# Patient Record
Sex: MALE | Race: WHITE | NOT HISPANIC OR LATINO | Employment: FULL TIME | URBAN - METROPOLITAN AREA
[De-identification: names, ages, dates, MRNs, and addresses within clinical notes are randomized per-mention and may not be internally consistent; named-entity substitution may affect disease eponyms.]

---

## 2017-04-05 ENCOUNTER — OFFICE VISIT (OUTPATIENT)
Dept: PODIATRY | Facility: CLINIC | Age: 52
End: 2017-04-05
Payer: COMMERCIAL

## 2017-04-05 VITALS
WEIGHT: 197 LBS | HEIGHT: 72 IN | BODY MASS INDEX: 26.68 KG/M2 | DIASTOLIC BLOOD PRESSURE: 64 MMHG | SYSTOLIC BLOOD PRESSURE: 100 MMHG | HEART RATE: 56 BPM | TEMPERATURE: 98.2 F

## 2017-04-05 DIAGNOSIS — M72.2 PLANTAR FASCIITIS: Primary | ICD-10-CM

## 2017-04-05 PROCEDURE — 99203 OFFICE O/P NEW LOW 30 MIN: CPT | Performed by: PODIATRIST

## 2017-04-05 RX ORDER — ATORVASTATIN CALCIUM 40 MG/1
40 TABLET, FILM COATED ORAL DAILY
COMMUNITY

## 2017-04-05 RX ORDER — FLECAINIDE ACETATE 100 MG/1
200 TABLET ORAL
Refills: 3 | COMMUNITY
Start: 2017-02-03 | End: 2024-09-24

## 2017-04-05 NOTE — PATIENT INSTRUCTIONS
PLANTAR FASCIITIS     What is plantar fasciitis?     Plantar fasciitis is often referred to as heel spurs or heel pain. Plantar fasciitis is a very common problem that affects people of all foot shapes, age, weight and activity level. Pain may be in the arch or on the weight-bearing surface of the heel. The pain may come on without injury or identifiable cause. Pain is generally present when first getting out of bed in the morning or up from a seated break.   What causes plantar fasciitis?     The plantar fascia is a dense fibrous band of tissue that stretches across the bottom surface of the foot. The fascia helps support the foot muscles and arch. Plantar fasciitis is thought to be caused by mechanical strain or overload. Frequent walking without shoes or wearing unsupportive shoes is thought to cause structural overload and ultimately inflammation of the plantar fascia. Some people have heel spurs that can be seen on x-ray. The heel spur is actually evidence of plantar fascitis and is not the cause.   How long will this last?     Plantar fasciitis can last from one day to a lifetime. Some people get intermittent fasciitis that is very short-lived. Others suffer daily for years. Excessive body weight, frequent bare foot walking, long hours on the feet, inadequate shoes, predisposing foot structures and excessive activity such as running are all potential issues that lead to chronic and/or recurring plantar fasciitis. Having plantar fasciitis means that you are forever prone to this problem and will require modification of some of the above factors. Most people seek treatment within one to four months. Healing usually requires a similar one to four month time frame. Healing time is relative to the amount of effort spent treating the problem.   What can I do?     The easiest solution is to stop walking around your home without shoes. Plantar fasciitis is largely a shoe problem. Shoes are either not being worn often  enough or your current shoes are inadequate for your weight, foot structure or activity level. The majority of shoes on the market today are not sufficient to resist development of plantar fasciitis or to promote healing. Assume that your current shoes are inadequate and will need to be replaced. Even high quality shoes wear out with 6 months to one year of frequent use. Weight loss is another option. Losing ten pounds in the next two months may be enough to resolve the problem. Ice applied to the area of pain two to three times per day for ten minutes each session can be very helpful. This should continue until the problem resolves. Achilles tendon stretching is essential. Stretch multiple times daily to promote healing and to prevent recurrence in the future.     What if this does not help?     Medical treatments often include custom arch supports, cortisone injections, physical therapy, splints to be worn in bed, prescription medications and surgery. The home treatments listed above will be necessary regardless of these advanced medical treatments. Surgery is rarely needed but is very helpful in selected cases.     Heel pain in my future?   Plantar fasciitis is highly recurrent. Risk factors often continue, including return to barefoot walking, inadequate shoes, excessive body weight, excessive activities, etc. Your lifestyle and foot structure may predispose you to recurrent plantar fasciitis. A daily prevention regimen can be very helpful. Ongoing use of shoe inserts, careful attention to appropriate shoes, daily Achilles stretching, etc. may prevent recurrence. Prompt attention at the earliest warning signs of heel pain can resolve the problem in as short as a few days.   Below are some exercises for Plantar fasciitis:  Stair exercise  Step on a stair with the ball of your foot and hold your position for at least 15 seconds, then slowly step down with the heels of your foot. You can do this daily and as often  as you want.   Picking the towel  Sit comfortably and then pick at a towel with your toes. Do this at least 10 to 20 times regularly. You can use any object other than a towel as long as the material is soft.  Rolling the bottle or ball  You can get a small ball or bottle and then roll it with your foot. Do this daily for at least 15 to 20 times.   Stretching the calf  Lie on your back, raise one foot, and then point your toes towards the floor. Do this daily for at least 15 to 20 times.   Flex the toes  Sit comfortably and then flex your toes by pointing it towards the floor or towards your body. This will relax and flex your foot and exercise your plantar fascia, the calf, and the Achilles tendon. The inability of the foot to stretch often causes the bunching up of the plantar fascia area, leading to the pain.  Towel stretch  Sling a towel around the ball of the foot and stretch the foot back.  Hold for 15-20 seconds.  Do this 4-5 times/day.    Massaging the calf and the plantar fascia also helps a lot in alleviating the pain and preventing its recurrence.            Over the Counter Inserts    Super Feet are the most common and easiest to find.    Locations include any [employer], [employer] in [place] on [address] [address] and in [place] on [address], [employer] in [place] on [address], [facility] Running Room in [place] on [facility], [facility] Running Room in [place] on [address], [employer] in [place] on [address] [address] and [employer] Sport Shop in [place] on [address] and in [place] [place] on [facility].    Spenco can be found online and at [employer]oe [employer] in [place] on [address], [employer] in [facility], [employer] Running Store in [place] on [address] [facility], [employer] in [place] on [address] and [employer] in [place] on [address].    Power Step can be a little harder to find.  Locations include [employer] in [place] on [address],  [employer] in [facility], [employer] Store in [place] on [address] and online    Walk-Fit - [employer]     [facility]    **  A good high quality over the counter insert can cost around $40-$50.        Body Mass Index (BMI)  Many things can cause foot and ankle problems. Foot structure, activity level, foot mechanics and injuries are common causes of pain.  One very important issue that often goes unmentioned is body weight. Extra weight can cause increased stress on muscles, ligaments, bones and tendons. Sometimes just a few extra pounds is all it takes to put one over her/his threshold. Without reducing that stress, it can be difficult to alleviate pain.   Some people are uncomfortable addressing this issue, but we feel it is important for you to think about it. As Foot & Ankle specialists, our job is addressing the lower extremity problem and possible causes.   Regarding extra body weight, we encourage patients to discuss diet and weight management plans with their primary care doctors. It is this team approach that gives you the best opportunity for pain relief and getting you back on your feet.

## 2017-04-05 NOTE — LETTER
4/5/2017       RE: Lizandro Collado  [address]    [place] [address]           Dear Colleague,    Thank you for referring your patient, Lizandro Collado, to the [facility]. Please see a copy of my visit note below.    PATIENT HISTORY:  Lizandro Collado is a 52 year old male who presents to clinic for left plantar heel and arch pain.  Present for a month.  No injury.  Worse with activity and after rest.  5-8/10 pain.  He has tried ice, Dr. Jimenez's inserts.  No improvement.      Review of Systems:  Patient denies fever, chills, rash, wound, stiffness, limping, numbness, weakness, heart burn, blood in stool, chest pain with activity, calf pain when walking, shortness of breath with activity, chronic cough, easy bleeding/bruising, swelling of ankles, excessive thirst, fatigue, depression, anxiety.       PAST MEDICAL HISTORY: History reviewed. No pertinent past medical history.     PAST SURGICAL HISTORY: History reviewed. No pertinent surgical history.     MEDICATIONS:   Current Outpatient Prescriptions:      atorvastatin (LIPITOR) 40 MG tablet, Take 40 mg by mouth daily, Disp: , Rfl:      METOPROLOL SUCCINATE ER PO, Take by mouth daily, Disp: , Rfl:      flecainide (TAMBOCOR) 100 MG tablet, TK ONE-HALF T PO BID, Disp: , Rfl: 3     ALLERGIES:  No Known Allergies     SOCIAL HISTORY:   Social History     Social History     Marital status:      Spouse name: N/A     Number of children: N/A     Years of education: N/A     Occupational History     Not on file.     Social History Main Topics     Smoking status: Never Smoker     Smokeless tobacco: Not on file     Alcohol use Not on file     Drug use: Not on file     Sexual activity: Not on file     Other Topics Concern     Not on file     Social History Narrative     No narrative on file        FAMILY HISTORY: History reviewed. No pertinent family history.     EXAM:Vitals: /64 (Cuff Size: Adult Large)  Pulse 56  Temp 98.2  F (36.8  " C) (Oral)  Ht 5' 11.75\" (1.822 m)  Wt 197 lb (89.4 kg)  BMI 26.9 kg/m2  BMI= Body mass index is 26.9 kg/(m^2).    General appearance: Patient is alert and fully cooperative with history & exam.  No sign of distress is noted during the visit.     Psychiatric: Affect is pleasant & appropriate.  Patient appears motivated to improve health.     Respiratory: Breathing is regular & unlabored while sitting.     HEENT: Hearing is intact to spoken word.  Speech is clear.  No gross evidence of visual impairment that would impact ambulation.     Dermatologic: Skin is intact to both lower extremities without significant lesions, rash or abrasion.  No paronychia or evidence of soft tissue infection is noted.     Vascular: DP & PT pulses are intact & regular bilaterally.  No significant edema or varicosities noted.  CFT and skin temperature are normal to both lower extremities.     Neurologic: Lower extremity sensation is intact to light touch.  No evidence of weakness or contracture in the lower extremities.  No evidence of neuropathy.     Musculoskeletal: Left plantar heel and arch pain along the plantar fascia with palpatoin.  no pain with heel squeeze.  Patient is ambulatory without assistive device or brace.  No gross ankle deformity noted.  No foot or ankle joint effusion is noted.     ASSESSMENT: Plantar fasciitis, left     PLAN:  Reviewed patient's chart in epic.  Discussed condition and treatment options including pros and cons.    The potential causes and nature of plantar fasciitis were discussed with the patient.  We reviewed the natural history/prognosis of the condition and risks if left untreated.       We discussed possible causes of the condition as it relates to the patients specific situation.      Conservative treatment options were reviewed:  appropriate shoes, avoidance of barefoot walking, inserts/orthoses, stretching, ice, massage, immobilization and NSAIDs.     We also reviewed the options of " injection therapy and surgery.  However, it was made clear that surgery is only considered when conservative therapy fails.       After thorough discussion and answering all questions, the patient elected to try icing, stretching, superfeet.  Night splint offered, but pt declined.  F/u 1 month prn.          Pineda Moon DPM, FACFAS        Weight management plan: Patient was referred to their PCP to discuss a diet and exercise plan.      Again, thank you for allowing me to participate in the care of your patient.        Sincerely,              Pineda Moon DPM

## 2017-04-05 NOTE — PROGRESS NOTES
"PATIENT HISTORY:  Lizandro Collado is a 52 year old male who presents to clinic for left plantar heel and arch pain.  Present for a month.  No injury.  Worse with activity and after rest.  5-8/10 pain.  He has tried ice, Dr. Jimenez's inserts.  No improvement.      Review of Systems:  Patient denies fever, chills, rash, wound, stiffness, limping, numbness, weakness, heart burn, blood in stool, chest pain with activity, calf pain when walking, shortness of breath with activity, chronic cough, easy bleeding/bruising, swelling of ankles, excessive thirst, fatigue, depression, anxiety.       PAST MEDICAL HISTORY: History reviewed. No pertinent past medical history.     PAST SURGICAL HISTORY: History reviewed. No pertinent surgical history.     MEDICATIONS:   Current Outpatient Prescriptions:      atorvastatin (LIPITOR) 40 MG tablet, Take 40 mg by mouth daily, Disp: , Rfl:      METOPROLOL SUCCINATE ER PO, Take by mouth daily, Disp: , Rfl:      flecainide (TAMBOCOR) 100 MG tablet, TK ONE-HALF T PO BID, Disp: , Rfl: 3     ALLERGIES:  No Known Allergies     SOCIAL HISTORY:   Social History     Social History     Marital status:      Spouse name: N/A     Number of children: N/A     Years of education: N/A     Occupational History     Not on file.     Social History Main Topics     Smoking status: Never Smoker     Smokeless tobacco: Not on file     Alcohol use Not on file     Drug use: Not on file     Sexual activity: Not on file     Other Topics Concern     Not on file     Social History Narrative     No narrative on file        FAMILY HISTORY: History reviewed. No pertinent family history.     EXAM:Vitals: /64 (Cuff Size: Adult Large)  Pulse 56  Temp 98.2  F (36.8  C) (Oral)  Ht 5' 11.75\" (1.822 m)  Wt 197 lb (89.4 kg)  BMI 26.9 kg/m2  BMI= Body mass index is 26.9 kg/(m^2).    General appearance: Patient is alert and fully cooperative with history & exam.  No sign of distress is noted during the visit.   "   Psychiatric: Affect is pleasant & appropriate.  Patient appears motivated to improve health.     Respiratory: Breathing is regular & unlabored while sitting.     HEENT: Hearing is intact to spoken word.  Speech is clear.  No gross evidence of visual impairment that would impact ambulation.     Dermatologic: Skin is intact to both lower extremities without significant lesions, rash or abrasion.  No paronychia or evidence of soft tissue infection is noted.     Vascular: DP & PT pulses are intact & regular bilaterally.  No significant edema or varicosities noted.  CFT and skin temperature are normal to both lower extremities.     Neurologic: Lower extremity sensation is intact to light touch.  No evidence of weakness or contracture in the lower extremities.  No evidence of neuropathy.     Musculoskeletal: Left plantar heel and arch pain along the plantar fascia with palpatoin.  no pain with heel squeeze.  Patient is ambulatory without assistive device or brace.  No gross ankle deformity noted.  No foot or ankle joint effusion is noted.     ASSESSMENT: Plantar fasciitis, left     PLAN:  Reviewed patient's chart in epic.  Discussed condition and treatment options including pros and cons.    The potential causes and nature of plantar fasciitis were discussed with the patient.  We reviewed the natural history/prognosis of the condition and risks if left untreated.       We discussed possible causes of the condition as it relates to the patients specific situation.      Conservative treatment options were reviewed:  appropriate shoes, avoidance of barefoot walking, inserts/orthoses, stretching, ice, massage, immobilization and NSAIDs.     We also reviewed the options of injection therapy and surgery.  However, it was made clear that surgery is only considered when conservative therapy fails.       After thorough discussion and answering all questions, the patient elected to try icing, stretching, superfeet.  Night splint  offered, but pt declined.  F/u 1 month prn.          Pineda Moon, LUIS ANGELM, FACFAS

## 2017-04-05 NOTE — MR AVS SNAPSHOT
After Visit Summary   4/5/2017    Lizandro Collado    MRN: [id number]           Patient Information     Date Of Birth          1965        Visit Information        Provider Department      4/5/2017 4:00 PM Pineda Moon DPM [facility]        Today's Diagnoses     Plantar fasciitis    -  1      Care Instructions    PLANTAR FASCIITIS     What is plantar fasciitis?     Plantar fasciitis is often referred to as heel spurs or heel pain. Plantar fasciitis is a very common problem that affects people of all foot shapes, age, weight and activity level. Pain may be in the arch or on the weight-bearing surface of the heel. The pain may come on without injury or identifiable cause. Pain is generally present when first getting out of bed in the morning or up from a seated break.   What causes plantar fasciitis?     The plantar fascia is a dense fibrous band of tissue that stretches across the bottom surface of the foot. The fascia helps support the foot muscles and arch. Plantar fasciitis is thought to be caused by mechanical strain or overload. Frequent walking without shoes or wearing unsupportive shoes is thought to cause structural overload and ultimately inflammation of the plantar fascia. Some people have heel spurs that can be seen on x-ray. The heel spur is actually evidence of plantar fascitis and is not the cause.   How long will this last?     Plantar fasciitis can last from one day to a lifetime. Some people get intermittent fasciitis that is very short-lived. Others suffer daily for years. Excessive body weight, frequent bare foot walking, long hours on the feet, inadequate shoes, predisposing foot structures and excessive activity such as running are all potential issues that lead to chronic and/or recurring plantar fasciitis. Having plantar fasciitis means that you are forever prone to this problem and will require modification of some of the above factors. Most people  seek treatment within one to four months. Healing usually requires a similar one to four month time frame. Healing time is relative to the amount of effort spent treating the problem.   What can I do?     The easiest solution is to stop walking around your home without shoes. Plantar fasciitis is largely a shoe problem. Shoes are either not being worn often enough or your current shoes are inadequate for your weight, foot structure or activity level. The majority of shoes on the market today are not sufficient to resist development of plantar fasciitis or to promote healing. Assume that your current shoes are inadequate and will need to be replaced. Even high quality shoes wear out with 6 months to one year of frequent use. Weight loss is another option. Losing ten pounds in the next two months may be enough to resolve the problem. Ice applied to the area of pain two to three times per day for ten minutes each session can be very helpful. This should continue until the problem resolves. Achilles tendon stretching is essential. Stretch multiple times daily to promote healing and to prevent recurrence in the future.     What if this does not help?     Medical treatments often include custom arch supports, cortisone injections, physical therapy, splints to be worn in bed, prescription medications and surgery. The home treatments listed above will be necessary regardless of these advanced medical treatments. Surgery is rarely needed but is very helpful in selected cases.     Heel pain in my future?   Plantar fasciitis is highly recurrent. Risk factors often continue, including return to barefoot walking, inadequate shoes, excessive body weight, excessive activities, etc. Your lifestyle and foot structure may predispose you to recurrent plantar fasciitis. A daily prevention regimen can be very helpful. Ongoing use of shoe inserts, careful attention to appropriate shoes, daily Achilles stretching, etc. may prevent  recurrence. Prompt attention at the earliest warning signs of heel pain can resolve the problem in as short as a few days.   Below are some exercises for Plantar fasciitis:  Stair exercise  Step on a stair with the ball of your foot and hold your position for at least 15 seconds, then slowly step down with the heels of your foot. You can do this daily and as often as you want.   Picking the towel  Sit comfortably and then pick at a towel with your toes. Do this at least 10 to 20 times regularly. You can use any object other than a towel as long as the material is soft.  Rolling the bottle or ball  You can get a small ball or bottle and then roll it with your foot. Do this daily for at least 15 to 20 times.   Stretching the calf  Lie on your back, raise one foot, and then point your toes towards the floor. Do this daily for at least 15 to 20 times.   Flex the toes  Sit comfortably and then flex your toes by pointing it towards the floor or towards your body. This will relax and flex your foot and exercise your plantar fascia, the calf, and the Achilles tendon. The inability of the foot to stretch often causes the bunching up of the plantar fascia area, leading to the pain.  Towel stretch  Sling a towel around the ball of the foot and stretch the foot back.  Hold for 15-20 seconds.  Do this 4-5 times/day.    Massaging the calf and the plantar fascia also helps a lot in alleviating the pain and preventing its recurrence.            Over the Counter Inserts    Super Feet are the most common and easiest to find.    Locations include any [employer] Store, [employer] Sport[employer] Daily Sales Exchange in [place] on [facility] [address] and in [place] on [address], [place] in [facility] on [facility], [facility] Running Room in [facility], [facility] Running Room in [facility] [address], [employer] in [place] on [address] and [employer] Shop in [facility] on [address] and in [place] on  "[address].    Manuelo can be found online and at [employer] Shoe Shop in [facility] on [address], [employer] in [facility] on [address], Gear Running Store in [place] on [address], [employer] in [place] on [address] and [employer] in [place] on [address].    Power Step can be a little harder to find.  Locations include [employer] in [place] on [address], [place] in [facility], [employer] Store in [place] on [address] and online    Walk-Fit - [employer]     [facility][note 2]    **  A good high quality over the counter insert can cost around $40-$50.                Follow-ups after your visit        Follow-up notes from your care team     Return in about 4 weeks (around 5/3/2017), or if symptoms worsen or fail to improve.      Who to contact     If you have questions or need follow up information about today's clinic visit or your schedule please contact [facility] directly at [phone].  Normal or non-critical lab and imaging results will be communicated to you by Narratohart, letter or phone within 4 business days after the clinic has received the results. If you do not hear from us within 7 days, please contact the clinic through Law[employer] or phone. If you have a critical or abnormal lab result, we will notify you by phone as soon as possible.  Submit refill requests through [employer] or call your pharmacy and they will forward the refill request to us. Please allow 3 business days for your refill to be completed.          Additional Information About Your Visit        Narratohart Information     [employer] lets you send messages to your doctor, view your test results, renew your prescriptions, schedule appointments and more. To sign up, go to [email or web address] . Click on \"Log in\" on the left side of the screen, which will take you to the Welcome page. Then click on \"Sign up Now\" on the right side of the page.     You will be asked to enter the access code listed below, " "as well as some personal information. Please follow the directions to create your username and password.     Your access code is: [email or web address]  Expires: 2017  4:03 PM     Your access code will  in 90 days. If you need help or a new code, please call your [place] clinic or [phone].        Care EveryWhere ID     This is your Care EveryWhere ID. This could be used by other organizations to access your [place] medical records  [id number]        Your Vitals Were     Pulse Temperature Height BMI (Body Mass Index)          56 98.2  F (36.8  C) (Oral) 5' 11.75\" (1.822 m) 26.9 kg/m2         Blood Pressure from Last 3 Encounters:   17 100/64    Weight from Last 3 Encounters:   17 197 lb (89.4 kg)              Today, you had the following     No orders found for display       Primary Care Provider Office Phone # Fax #    Michael Barr [phone]       [facility] [place] [address] [place] [address]        Thank you!     Thank you for choosing [facility]  for your care. Our goal is always to provide you with excellent care. Hearing back from our patients is one way we can continue to improve our services. Please take a few minutes to complete the written survey that you may receive in the mail after your visit with us. Thank you!             Your Updated Medication List - Protect others around you: Learn how to safely use, store and throw away your medicines at www.disposemymeds.org.          This list is accurate as of: 17  4:03 PM.  Always use your most recent med list.                   Brand Name Dispense Instructions for use    atorvastatin 40 MG tablet    LIPITOR     Take 40 mg by mouth daily       flecainide 100 MG tablet    TAMBOCOR     TK ONE-HALF T PO BID       METOPROLOL SUCCINATE ER PO      Take by mouth daily         "

## 2024-08-13 ENCOUNTER — OFFICE VISIT (OUTPATIENT)
Dept: FAMILY MEDICINE | Facility: CLINIC | Age: 59
End: 2024-08-13
Payer: COMMERCIAL

## 2024-08-13 VITALS
SYSTOLIC BLOOD PRESSURE: 100 MMHG | DIASTOLIC BLOOD PRESSURE: 60 MMHG | WEIGHT: 184.25 LBS | HEIGHT: 72 IN | BODY MASS INDEX: 24.95 KG/M2 | RESPIRATION RATE: 20 BRPM | OXYGEN SATURATION: 99 % | TEMPERATURE: 97.4 F | HEART RATE: 55 BPM

## 2024-08-13 DIAGNOSIS — Z00.00 HEALTHCARE MAINTENANCE: ICD-10-CM

## 2024-08-13 DIAGNOSIS — R35.1 BENIGN PROSTATIC HYPERPLASIA WITH NOCTURIA: Primary | ICD-10-CM

## 2024-08-13 DIAGNOSIS — R53.83 OTHER FATIGUE: ICD-10-CM

## 2024-08-13 DIAGNOSIS — R39.9 LOWER URINARY TRACT SYMPTOMS (LUTS): ICD-10-CM

## 2024-08-13 DIAGNOSIS — Z00.00 ANNUAL PHYSICAL EXAM: ICD-10-CM

## 2024-08-13 DIAGNOSIS — N40.1 BENIGN PROSTATIC HYPERPLASIA WITH NOCTURIA: Primary | ICD-10-CM

## 2024-08-13 DIAGNOSIS — I48.0 PAF (PAROXYSMAL ATRIAL FIBRILLATION) (H): ICD-10-CM

## 2024-08-13 LAB
ALBUMIN SERPL BCG-MCNC: 4.7 G/DL (ref 3.5–5.2)
ALP SERPL-CCNC: 83 U/L (ref 40–150)
ALT SERPL W P-5'-P-CCNC: 25 U/L (ref 0–70)
ANION GAP SERPL CALCULATED.3IONS-SCNC: 9 MMOL/L (ref 7–15)
AST SERPL W P-5'-P-CCNC: 24 U/L (ref 0–45)
BILIRUB SERPL-MCNC: 1.1 MG/DL
BUN SERPL-MCNC: 17.7 MG/DL (ref 8–23)
CALCIUM SERPL-MCNC: 9.6 MG/DL (ref 8.8–10.4)
CHLORIDE SERPL-SCNC: 98 MMOL/L (ref 98–107)
CHOLEST SERPL-MCNC: 217 MG/DL
CREAT SERPL-MCNC: 0.93 MG/DL (ref 0.67–1.17)
EGFRCR SERPLBLD CKD-EPI 2021: >90 ML/MIN/1.73M2
ERYTHROCYTE [DISTWIDTH] IN BLOOD BY AUTOMATED COUNT: 12 % (ref 10–15)
FASTING STATUS PATIENT QL REPORTED: YES
FASTING STATUS PATIENT QL REPORTED: YES
GLUCOSE SERPL-MCNC: 96 MG/DL (ref 70–99)
HCO3 SERPL-SCNC: 28 MMOL/L (ref 22–29)
HCT VFR BLD AUTO: 41.3 % (ref 40–53)
HDLC SERPL-MCNC: 44 MG/DL
HGB BLD-MCNC: 14.3 G/DL (ref 13.3–17.7)
LDLC SERPL CALC-MCNC: 163 MG/DL
MCH RBC QN AUTO: 31 PG (ref 26.5–33)
MCHC RBC AUTO-ENTMCNC: 34.6 G/DL (ref 31.5–36.5)
MCV RBC AUTO: 89 FL (ref 78–100)
NONHDLC SERPL-MCNC: 173 MG/DL
PLATELET # BLD AUTO: 275 10E3/UL (ref 150–450)
POTASSIUM SERPL-SCNC: 4.4 MMOL/L (ref 3.4–5.3)
PROT SERPL-MCNC: 7.3 G/DL (ref 6.4–8.3)
PSA SERPL DL<=0.01 NG/ML-MCNC: 1.44 NG/ML (ref 0–4.5)
RBC # BLD AUTO: 4.62 10E6/UL (ref 4.4–5.9)
SODIUM SERPL-SCNC: 135 MMOL/L (ref 135–145)
TRIGL SERPL-MCNC: 52 MG/DL
TSH SERPL DL<=0.005 MIU/L-ACNC: 3.28 UIU/ML (ref 0.3–4.2)
WBC # BLD AUTO: 5.3 10E3/UL (ref 4–11)

## 2024-08-13 PROCEDURE — 99214 OFFICE O/P EST MOD 30 MIN: CPT | Mod: 25 | Performed by: STUDENT IN AN ORGANIZED HEALTH CARE EDUCATION/TRAINING PROGRAM

## 2024-08-13 PROCEDURE — 99386 PREV VISIT NEW AGE 40-64: CPT | Performed by: STUDENT IN AN ORGANIZED HEALTH CARE EDUCATION/TRAINING PROGRAM

## 2024-08-13 PROCEDURE — 80053 COMPREHEN METABOLIC PANEL: CPT | Performed by: STUDENT IN AN ORGANIZED HEALTH CARE EDUCATION/TRAINING PROGRAM

## 2024-08-13 PROCEDURE — 84443 ASSAY THYROID STIM HORMONE: CPT | Performed by: STUDENT IN AN ORGANIZED HEALTH CARE EDUCATION/TRAINING PROGRAM

## 2024-08-13 PROCEDURE — G0103 PSA SCREENING: HCPCS | Performed by: STUDENT IN AN ORGANIZED HEALTH CARE EDUCATION/TRAINING PROGRAM

## 2024-08-13 PROCEDURE — 36415 COLL VENOUS BLD VENIPUNCTURE: CPT | Performed by: STUDENT IN AN ORGANIZED HEALTH CARE EDUCATION/TRAINING PROGRAM

## 2024-08-13 PROCEDURE — 85027 COMPLETE CBC AUTOMATED: CPT | Performed by: STUDENT IN AN ORGANIZED HEALTH CARE EDUCATION/TRAINING PROGRAM

## 2024-08-13 PROCEDURE — 80061 LIPID PANEL: CPT | Performed by: STUDENT IN AN ORGANIZED HEALTH CARE EDUCATION/TRAINING PROGRAM

## 2024-08-13 RX ORDER — TAMSULOSIN HYDROCHLORIDE 0.4 MG/1
0.8 CAPSULE ORAL DAILY
Qty: 180 CAPSULE | Refills: 3 | Status: SHIPPED | OUTPATIENT
Start: 2024-08-13

## 2024-08-13 SDOH — HEALTH STABILITY: PHYSICAL HEALTH: ON AVERAGE, HOW MANY DAYS PER WEEK DO YOU ENGAGE IN MODERATE TO STRENUOUS EXERCISE (LIKE A BRISK WALK)?: 3 DAYS

## 2024-08-13 ASSESSMENT — PAIN SCALES - GENERAL: PAINLEVEL: NO PAIN (0)

## 2024-08-13 ASSESSMENT — SOCIAL DETERMINANTS OF HEALTH (SDOH): HOW OFTEN DO YOU GET TOGETHER WITH FRIENDS OR RELATIVES?: ONCE A WEEK

## 2024-08-13 NOTE — PROGRESS NOTES
Assessment/ Plan   59-year-old male with past medical history of paroxysmal A-fib who presents for discussion of care and annual physical exam and several concerns    1. Benign prostatic hyperplasia with nocturia  Patient tells me he has a history of BPH and was put on Flomax approximately 4 months ago.  He has been having urinary symptoms with nocturia, polyuria, small amounts and low flow when urinating.  I recommended he trial going up to 2 tablets daily instead of 1 and see how this improves symptoms.  If it does not improve them enough I recommend he contact me and would send to urology to consider TURP  - tamsulosin (FLOMAX) 0.4 MG capsule; Take 2 capsules (0.8 mg) by mouth daily  Dispense: 180 capsule; Refill: 3    2. Lower urinary tract symptoms (LUTS)  - PSA, screen; Future  - PSA, screen    3. PAF (paroxysmal atrial fibrillation) (H)  Patient on metoprolol and flecainide daily.  From review of history his A-fib was brought on by episodes of binge drinking.  I question this regimen and recommend patient see a cardiologist to review this.  He has not seen one in several years.  Will send referral for this.  - Adult Cardiology Eval [facility] Referral; Future    4. Other fatigue  Patient having fatigue.  STOP-BANG score 5 today.  He wanted to pursue improving his urination at night and checking a thyroid today.  If improving how frequently he is getting up at night does not improve his fatigue he may consider doing a sleep study in the future.  He would contact me for this.  - TSH with free T4 reflex; Future  - TSH with free T4 reflex    5. Healthcare maintenance    6. Annual physical exam  - Comprehensive metabolic panel (BMP + Alb, Alk Phos, ALT, AST, Total. Bili, TP); Future  - CBC with platelets; Future  - Lipid panel reflex to direct LDL Fasting; Future  - Comprehensive metabolic panel (BMP + Alb, Alk Phos, ALT, AST, Total. Bili, TP)  - CBC with platelets  - Lipid panel reflex to direct LDL  Fasting    Follow-up in: 1 year for physical or PRN sooner     Jose Levine MD    Counseling  Appropriate preventive services were discussed with this patient, including applicable screening as appropriate for fall prevention, nutrition, physical activity, Tobacco-use cessation, weight loss and cognition    Subjective:     Lizandro Collado is a 59 year old male who presents for an annual exam.     Chief Complaint   Patient presents with    Physical     Pills for heart check     Urinary Frequency     Stop Bang Questionnaire       STOP-BANG Sleep Apnea Questionnaire  Man PHELAN et al Anesthesiology 2008 and BJA 2012    STOP  Do you SNORE loudly (louder than talking or loud  enough to be heard through closed doors)? YES    Do you often feel TIRED, fatigued, or sleepy during  Daytime? YES    Has anyone OBSERVED you stop breathing during  your sleep? No    Do you have or are you being treated for high blood  PRESSURE? No    BANG  BMI more than 35kg/m2? No  AGE over 50 years old? YES  NECK circumference > 16 inches (40cm)? YES  GENDER: Male? YES    TOTAL SCORE  High risk of ИРИНА: Yes 5 - 8  Intermediate risk of ИРИНА: Yes 3 - 4  Low risk of ИРИНА: Yes 0 - 2    Colonoscopy 2020 about. Will try to get records JOANNE        There is no immunization history on file for this patient.  Immunization status: up to date and documented.     Current Outpatient Medications   Medication Sig Dispense Refill    atorvastatin (LIPITOR) 40 MG tablet Take 40 mg by mouth daily      flecainide (TAMBOCOR) 100 MG tablet TK ONE-HALF T PO BID  3    METOPROLOL SUCCINATE ER PO Take by mouth daily       No past medical history on file.  No past surgical history on file.  Patient has no known allergies.  No family history on file.  Social History     Socioeconomic History    Marital status:      Spouse name: Not on file    Number of children: Not on file    Years of education: Not on file    Highest education level: Not on file   Occupational History     Not on file   Tobacco Use    Smoking status: Never    Smokeless tobacco: Not on file   Substance and Sexual Activity    Alcohol use: Not on file    Drug use: Not on file    Sexual activity: Not on file   Other Topics Concern    Parent/sibling w/ CABG, MI or angioplasty before 65F 55M? Not Asked   Social History Narrative    Not on file     Social Determinants of Health     Financial Resource Strain: Low Risk  (8/13/2024)    Financial Resource Strain     Within the past 12 months, have you or your family members you live with been unable to get utilities (heat, electricity) when it was really needed?: No   Food Insecurity: Low Risk  (8/13/2024)    Food Insecurity     Within the past 12 months, did you worry that your food would run out before you got money to buy more?: No     Within the past 12 months, did the food you bought just not last and you didn t have money to get more?: No   Transportation Needs: Low Risk  (8/13/2024)    Transportation Needs     Within the past 12 months, has lack of transportation kept you from medical appointments, getting your medicines, non-medical meetings or appointments, work, or from getting things that you need?: No   Physical Activity: Unknown (8/13/2024)    Exercise Vital Sign     Days of Exercise per Week: 3 days     Minutes of Exercise per Session: Not on file   Stress: Stress Concern Present (8/13/2024)    [place] [employer] - Occupational Stress Questionnaire     Feeling of Stress : Rather much   Social Connections: Unknown (8/13/2024)    Social Connection and Isolation Panel [NHANES]     Frequency of Communication with Friends and Family: Not on file     Frequency of Social Gatherings with Friends and Family: Once a week     Attends Voodoo Services: Not on file     Active Member of Clubs or Organizations: Not on file     Attends Club or Organization Meetings: Not on file     Marital Status: Not on file   Interpersonal Safety: Low Risk  (8/13/2024)     Interpersonal Safety     Do you feel physically and emotionally safe where you currently live?: Yes     Within the past 12 months, have you been hit, slapped, kicked or otherwise physically hurt by someone?: No     Within the past 12 months, have you been humiliated or emotionally abused in other ways by your partner or ex-partner?: No   Housing Stability: Low Risk  (8/13/2024)    Housing Stability     Do you have housing? : Yes     Are you worried about losing your housing?: No       Review of Systems  Complete ROS negative except as noted in the HPI    Objective:      Vitals:    08/13/24 0703   BP: 100/60   Pulse: 55   Resp: 20   Temp: 97.4  F (36.3  C)   SpO2: 99%   Weight: 83.6 kg (184 lb 4 oz)   Height: 1.829 m (6')       General appearance: Alert, cooperative, no distress, appears stated age  Head: Normocephalic, atraumatic, without obvious abnormality  EARS: TM's gray dull with structures seen bilaterally  Eyes: Pupils equal round, reactive.  Conjunctiva clear.  Nose: Nares normal, no drainage.  Throat: Lips, mucosa, tongue normal mucosa pink and moist  Neck: Supple, symmetric, trachea midline, no adenopathy.  No thyroid enlargement, tenderness or nodules.    Lungs: Clear to auscultation bilaterally, no wheezing or crackles present.  Respirations unlabored  Heart: Regular rate and rhythm, normal S1 and S2, no murmur, rub or gallop.  Abdomen: Soft, nontender, nondistended.  Bowel sounds active in all 4 quadrants.  No masses or organomegaly.  Extremities: Extremities normal, atraumatic.  No cyanosis or edema.  Skin: Skin color, texture, turgor normal no rashes or lesions on limited skin exam  Neurologic: CN II through XII intact, normal strength.      Jose Fisher MD

## 2024-08-14 NOTE — RESULT ENCOUNTER NOTE
Please call and give message below    Lizandro Collado  Your results from your recent clinic visit show:  Your cholesterol is somewhat high. Are you taking your atorvastatin daily?  If not I recommend you do this. If you are I think you should increase the dose as it is not controlling your cholesterol well enough  Your PSA, which is a screen for prostate cancer, was normal  Your thyroid is normal (TSH).  Your CMP was normal with normal electrolytes, kidney function, and liver function  Your CBC is normal with no anemia or signs of infection seen    If you have more questions please call the clinic at [phone] or send me a ????t message    Dr. Jose Levine

## 2024-08-16 ENCOUNTER — TELEPHONE (OUTPATIENT)
Dept: FAMILY MEDICINE | Facility: CLINIC | Age: 59
End: 2024-08-16
Payer: COMMERCIAL

## 2024-08-16 NOTE — TELEPHONE ENCOUNTER
FYI - Status Update    Who is Calling: patient    Update: Provider wanted to know when pt had his last colonoscopy. Pt called in to provide that information. It was completed in [facility] at [facility] by Dr. Nichole on April 16, 2021. The address is [address] [place], [place] [address].    Does caller want a call/response back: Yes     Okay to leave a detailed message?: Yes at Cell number on file:    Telephone Information:   Mobile [phone]

## 2024-08-19 NOTE — TELEPHONE ENCOUNTER
Need JOANNE done. This does not tell me when he is due again and this is based off what was found. Please help patient do this.    Jose Levine MD     PATIENT BACK FROM CT.

## 2024-09-24 ENCOUNTER — OFFICE VISIT (OUTPATIENT)
Dept: CARDIOLOGY | Facility: CLINIC | Age: 59
End: 2024-09-24
Attending: STUDENT IN AN ORGANIZED HEALTH CARE EDUCATION/TRAINING PROGRAM
Payer: COMMERCIAL

## 2024-09-24 VITALS
WEIGHT: 182 LBS | HEIGHT: 72 IN | SYSTOLIC BLOOD PRESSURE: 116 MMHG | BODY MASS INDEX: 24.65 KG/M2 | OXYGEN SATURATION: 99 % | HEART RATE: 57 BPM | DIASTOLIC BLOOD PRESSURE: 74 MMHG

## 2024-09-24 DIAGNOSIS — E78.01 FAMILIAL HYPERCHOLESTEREMIA: Primary | ICD-10-CM

## 2024-09-24 DIAGNOSIS — I48.0 PAF (PAROXYSMAL ATRIAL FIBRILLATION) (H): ICD-10-CM

## 2024-09-24 PROCEDURE — 93000 ELECTROCARDIOGRAM COMPLETE: CPT | Performed by: INTERNAL MEDICINE

## 2024-09-24 PROCEDURE — 99205 OFFICE O/P NEW HI 60 MIN: CPT | Performed by: INTERNAL MEDICINE

## 2024-09-24 PROCEDURE — G2211 COMPLEX E/M VISIT ADD ON: HCPCS | Performed by: INTERNAL MEDICINE

## 2024-09-24 RX ORDER — FLECAINIDE ACETATE 50 MG/1
50 TABLET ORAL 2 TIMES DAILY
Qty: 60 TABLET | Refills: 0 | Status: SHIPPED | OUTPATIENT
Start: 2024-09-24

## 2024-09-24 NOTE — LETTER
9/24/2024    Jose Fisher MD  [address] [facility] [address]    RE: Lizandro Collado       Dear Colleague,     I had the pleasure of seeing [facility] Collado in [facility].  HPI and Plan:   Lizandro is a very nice 59-year-old gentleman with past medical history significant for familial hypercholesterolemia and paroxysmal atrial fibrillation thought to be primarily holiday heart.  He has not seen a cardiologist since 2021 and is now referred back to get reestablished with cardiology because of flecainide.    Review of [facility] records shows his first episode was in 2010.  All episodes have been quite symptomatic and required cardioversion.  In 2011 he underwent cardioversion.  2012 he underwent A-fib ablation.  Ablation was not successful and he was started on flecainide.  His most recent episode appeared to be 2017.  He was last seen by cardiology through the [facility] system in 2021.  He had variable compliance at that time taking his flecainide only once a day.    Lizandro returns to clinic stating he has stopped all alcohol and has not had a reoccurrence since 2017.  He does remain on flecainide 100 mg twice daily and metoprolol succinate 12.5 mg twice daily.    In care everywhere he underwent a REN in 2017 described an ejection fraction of 55 to 60% with mild to moderate mitral regurgitation.  He has never had stress testing.  Review of the chart shows an LDL as high as 230.  He relates high cholesterols run throughout his family.  Fasting lipid profile on 40 mg of atorvastatin shows a total cholesterol of 217 with an HDL of 44 and LDL of 163 and triglycerides of 52.  His twelve-lead EKG shows normal sinus rhythm 55 bpm otherwise normal.    Family history is significant for his mother and father both dying of congestive heart failure in their mid 80s.  Neither 1 had a coronary event that he is aware of.  He is a lifelong non-smoker.  No longer drinks alcohol.  He does not have obstructive sleep  apnea diabetes mellitus or hypertension.    He mows his lawn, does his resistance activity twice a week and is quite active at work and states none of these things cause him any symptoms or limitations.    He asks about getting off of flecainide altogether as he states it was thought that alcohol was the primary trigger and he has not drank alcohol in years nor is he had a recurrent event in years.    Assessment and plan.  It appears it has been 7 years since Juan had his last occurrence.  At this time we will try weaning off his flecainide.  I have recommended that he decrease it to 50 mg twice a day for a month then discontinue altogether.  He can change his metoprolol succinate to a 25 mg 1 tablet once a day for convenience does not need to split the pill and take it twice a day.    Regarding his familial hypercholesterolemia he has had a nice response to atorvastatin 40.  I do not know if 163 LDL is adequate.  To evaluate this further I will set him up for a calcium score.  If the calcium score is quite high I may turn around and run a stress test.    Further evaluation treatment depend upon above results. Thank you for allow me to participate in this patient's care.  Sincerely,                               Carlos Costa MD [facility]      The longitudinal plan of care for the diagnosis(es)/condition(s) as documented were addressed during this visit. Due to the added complexity in care, I will continue to support Juan in the subsequent management and with ongoing continuity of care.          Today's clinic visit entailed:  Review of external notes as documented elsewhere in note  Review of the result(s) of each unique test - EKG, REN, lab work  The following tests were independently interpreted by me as noted in my documentation: EKG  Ordering of each unique test  Prescription drug management  60 minutes spent by me on the date of the encounter doing chart review, history and exam, documentation and further  activities per the note  Provider  Link to MDM Help Grid     The level of medical decision making during this visit was of moderate complexity.      Orders Placed This Encounter   Procedures     Follow-Up with Cardiology [facility]     EKG 12-lead complete w/read - Clinics (performed today)     EKG 12-lead complete w/read - Clinics (performed today)       Orders Placed This Encounter   Medications     flecainide (TAMBOCOR) 50 MG tablet     Sig: Take 1 tablet (50 mg) by mouth 2 times daily.     Dispense:  60 tablet     Refill:  0       Medications Discontinued During This Encounter   Medication Reason     flecainide (TAMBOCOR) 100 MG tablet          Encounter Diagnoses   Name Primary?     PAF (paroxysmal atrial fibrillation) (H)      Familial hypercholesteremia Yes       CURRENT MEDICATIONS:  Current Outpatient Medications   Medication Sig Dispense Refill     atorvastatin (LIPITOR) 40 MG tablet Take 40 mg by mouth daily       flecainide (TAMBOCOR) 50 MG tablet Take 1 tablet (50 mg) by mouth 2 times daily. 60 tablet 0     tamsulosin (FLOMAX) 0.4 MG capsule Take 2 capsules (0.8 mg) by mouth daily 180 capsule 3     METOPROLOL SUCCINATE ER PO Take 25 mg by mouth daily. Taking one half tablet in a.m. and one half tablet in p.m.         ALLERGIES   No Known Allergies    PAST MEDICAL HISTORY:  History reviewed. No pertinent past medical history.    PAST SURGICAL HISTORY:  History reviewed. No pertinent surgical history.    FAMILY HISTORY:  History reviewed. No pertinent family history.    SOCIAL HISTORY:  Social History     Socioeconomic History     Marital status:      Spouse name: None     Number of children: None     Years of education: None     Highest education level: None   Tobacco Use     Smoking status: Never   Substance and Sexual Activity     Alcohol use: Not Currently     Drug use: Never     Sexual activity: Yes     Partners: Female     Social Determinants of Health     Financial Resource Strain: Low Risk   (8/13/2024)    Financial Resource Strain      Within the past 12 months, have you or your family members you live with been unable to get utilities (heat, electricity) when it was really needed?: No   Food Insecurity: Low Risk  (8/13/2024)    Food Insecurity      Within the past 12 months, did you worry that your food would run out before you got money to buy more?: No      Within the past 12 months, did the food you bought just not last and you didn t have money to get more?: No   Transportation Needs: Low Risk  (8/13/2024)    Transportation Needs      Within the past 12 months, has lack of transportation kept you from medical appointments, getting your medicines, non-medical meetings or appointments, work, or from getting things that you need?: No   Physical Activity: Unknown (8/13/2024)    Exercise Vital Sign      Days of Exercise per Week: 3 days   Stress: Stress Concern Present (8/13/2024)    [employer] [place] [employer] - Occupational Stress Questionnaire      Feeling of Stress : Rather much   Social Connections: Unknown (8/13/2024)    Social Connection and Isolation Panel [NHANES]      Frequency of Social Gatherings with Friends and Family: Once a week   Interpersonal Safety: Low Risk  (8/13/2024)    Interpersonal Safety      Do you feel physically and emotionally safe where you currently live?: Yes      Within the past 12 months, have you been hit, slapped, kicked or otherwise physically hurt by someone?: No      Within the past 12 months, have you been humiliated or emotionally abused in other ways by your partner or ex-partner?: No   Housing Stability: Low Risk  (8/13/2024)    Housing Stability      Do you have housing? : Yes      Are you worried about losing your housing?: No       Review of Systems:  Skin:  not assessed       Eyes:  Positive for      ENT:         Respiratory:  Negative       Cardiovascular:  Negative;lightheadedness;dizziness;fatigue;chest pain;palpitations;edema       Gastroenterology: Negative      Genitourinary:  Positive for decreased urinary stream    Musculoskeletal:  Negative      Neurologic:  Negative      Psychiatric:  Negative      Heme/Lymph/Imm:  Negative      Endocrine:  not assessed        Physical Exam:  Vitals: /74   Pulse 57   Ht 1.829 m (6')   Wt 82.6 kg (182 lb)   SpO2 99%   BMI 24.68 kg/m      Constitutional:  cooperative, alert and oriented, well developed, well nourished, in no acute distress        Skin:  warm and dry to the touch, no apparent skin lesions or masses noted          Head:  normocephalic, no masses or lesions        Eyes:  pupils equal and round, conjunctivae and lids unremarkable, sclera white, no xanthalasma, EOMS intact, no nystagmus        Lymph:      ENT:  no pallor or cyanosis, dentition good        Neck:  no carotid bruit        Respiratory:  normal breath sounds, clear to auscultation, normal A-P diameter, normal symmetry, normal respiratory excursion, no use of accessory muscles         Cardiac: regular rhythm;no murmurs, gallops or rubs detected                pulses full and equal                                        GI:           Extremities and Muscular Skeletal:  no edema;no spinal abnormalities noted;normal muscle strength and tone              Neurological:  no gross motor deficits        Psych:  affect appropriate, oriented to time, person and place        CC  Jose Fisher MD  [address] [place] [address]                  Thank you for allowing me to participate in the care of your patient.      Sincerely,     Carlos Costa MD     [facility]  cc:   Jose Fisher MD  [address] [place] [address]

## 2024-09-24 NOTE — PROGRESS NOTES
HPI and Plan:   Lizandro is a very nice 59-year-old gentleman with past medical history significant for familial hypercholesterolemia and paroxysmal atrial fibrillation thought to be primarily holiday heart.  He has not seen a cardiologist since 2021 and is now referred back to get reestablished with cardiology because of flecainide.    Review of [facility] records shows his first episode was in 2010.  All episodes have been quite symptomatic and required cardioversion.  In 2011 he underwent cardioversion.  2012 he underwent A-fib ablation.  Ablation was not successful and he was started on flecainide.  His most recent episode appeared to be 2017.  He was last seen by cardiology through [facility] in 2021.  He had variable compliance at that time taking his flecainide only once a day.    Lizandro returns to clinic stating he has stopped all alcohol and has not had a reoccurrence since 2017.  He does remain on flecainide 100 mg twice daily and metoprolol succinate 12.5 mg twice daily.    In care everywhere he underwent a REN in 2017 described an ejection fraction of 55 to 60% with mild to moderate mitral regurgitation.  He has never had stress testing.  Review of the chart shows an LDL as high as 230.  He relates high cholesterols run throughout his family.  Fasting lipid profile on 40 mg of atorvastatin shows a total cholesterol of 217 with an HDL of 44 and LDL of 163 and triglycerides of 52.  His twelve-lead EKG shows normal sinus rhythm 55 bpm otherwise normal.    Family history is significant for his mother and father both dying of congestive heart failure in their mid 80s.  Neither 1 had a coronary event that he is aware of.  He is a lifelong non-smoker.  No longer drinks alcohol.  He does not have obstructive sleep apnea diabetes mellitus or hypertension.    He mows his lawn, does his resistance activity twice a week and is quite active at work and states none of these things cause him any symptoms or  limitations.    He asks about getting off of flecainide altogether as he states it was thought that alcohol was the primary trigger and he has not drank alcohol in years nor is he had a recurrent event in years.    Assessment and plan.  It appears it has been 7 years since Juan had his last occurrence.  At this time we will try weaning off his flecainide.  I have recommended that he decrease it to 50 mg twice a day for a month then discontinue altogether.  He can change his metoprolol succinate to a 25 mg 1 tablet once a day for convenience does not need to split the pill and take it twice a day.    Regarding his familial hypercholesterolemia he has had a nice response to atorvastatin 40.  I do not know if 163 LDL is adequate.  To evaluate this further I will set him up for a calcium score.  If the calcium score is quite high I may turn around and run a stress test.    Further evaluation treatment depend upon above results. Thank you for allow me to participate in this patient's care.  Sincerely,                               Carlos Costa MD [facility]      The longitudinal plan of care for the diagnosis(es)/condition(s) as documented were addressed during this visit. Due to the added complexity in care, I will continue to support Juan in the subsequent management and with ongoing continuity of care.          Today's clinic visit entailed:  Review of external notes as documented elsewhere in note  Review of the result(s) of each unique test - EKG, REN, lab work  The following tests were independently interpreted by me as noted in my documentation: EKG  Ordering of each unique test  Prescription drug management  60 minutes spent by me on the date of the encounter doing chart review, history and exam, documentation and further activities per the note  Provider  Link to [facility] Help Grid     The level of medical decision making during this visit was of moderate complexity.      Orders Placed This Encounter   Procedures     Follow-Up with Cardiology [facility]    EKG 12-lead complete w/read - Clinics (performed today)    EKG 12-lead complete w/read - Clinics (performed today)       Orders Placed This Encounter   Medications    flecainide (TAMBOCOR) 50 MG tablet     Sig: Take 1 tablet (50 mg) by mouth 2 times daily.     Dispense:  60 tablet     Refill:  0       Medications Discontinued During This Encounter   Medication Reason    flecainide (TAMBOCOR) 100 MG tablet          Encounter Diagnoses   Name Primary?    PAF (paroxysmal atrial fibrillation) (H)     Familial hypercholesteremia Yes       CURRENT MEDICATIONS:  Current Outpatient Medications   Medication Sig Dispense Refill    atorvastatin (LIPITOR) 40 MG tablet Take 40 mg by mouth daily      flecainide (TAMBOCOR) 50 MG tablet Take 1 tablet (50 mg) by mouth 2 times daily. 60 tablet 0    tamsulosin (FLOMAX) 0.4 MG capsule Take 2 capsules (0.8 mg) by mouth daily 180 capsule 3    METOPROLOL SUCCINATE ER PO Take 25 mg by mouth daily. Taking one half tablet in a.m. and one half tablet in p.m.         ALLERGIES   No Known Allergies    PAST MEDICAL HISTORY:  History reviewed. No pertinent past medical history.    PAST SURGICAL HISTORY:  History reviewed. No pertinent surgical history.    FAMILY HISTORY:  History reviewed. No pertinent family history.    SOCIAL HISTORY:  Social History     Socioeconomic History    Marital status:      Spouse name: None    Number of children: None    Years of education: None    Highest education level: None   Tobacco Use    Smoking status: Never   Substance and Sexual Activity    Alcohol use: Not Currently    Drug use: Never    Sexual activity: Yes     Partners: Female     Social Determinants of Health     Financial Resource Strain: Low Risk  (8/13/2024)    Financial Resource Strain     Within the past 12 months, have you or your family members you live with been unable to get utilities (heat, electricity) when it was really needed?: No   Food Insecurity:  Low Risk  (8/13/2024)    Food Insecurity     Within the past 12 months, did you worry that your food would run out before you got money to buy more?: No     Within the past 12 months, did the food you bought just not last and you didn t have money to get more?: No   Transportation Needs: Low Risk  (8/13/2024)    Transportation Needs     Within the past 12 months, has lack of transportation kept you from medical appointments, getting your medicines, non-medical meetings or appointments, work, or from getting things that you need?: No   Physical Activity: Unknown (8/13/2024)    Exercise Vital Sign     Days of Exercise per Week: 3 days   Stress: Stress Concern Present (8/13/2024)    [place] [employer] - Occupational Stress Questionnaire     Feeling of Stress : Rather much   Social Connections: Unknown (8/13/2024)    Social Connection and Isolation Panel [NHANES]     Frequency of Social Gatherings with Friends and Family: Once a week   Interpersonal Safety: Low Risk  (8/13/2024)    Interpersonal Safety     Do you feel physically and emotionally safe where you currently live?: Yes     Within the past 12 months, have you been hit, slapped, kicked or otherwise physically hurt by someone?: No     Within the past 12 months, have you been humiliated or emotionally abused in other ways by your partner or ex-partner?: No   Housing Stability: Low Risk  (8/13/2024)    Housing Stability     Do you have housing? : Yes     Are you worried about losing your housing?: No       Review of Systems:  Skin:  not assessed       Eyes:  Positive for      ENT:         Respiratory:  Negative       Cardiovascular:  Negative;lightheadedness;dizziness;fatigue;chest pain;palpitations;edema      Gastroenterology: Negative      Genitourinary:  Positive for decreased urinary stream    Musculoskeletal:  Negative      Neurologic:  Negative      Psychiatric:  Negative      Heme/Lymph/Imm:  Negative      Endocrine:  not assessed         Physical Exam:  Vitals: /74   Pulse 57   Ht 1.829 m (6')   Wt 82.6 kg (182 lb)   SpO2 99%   BMI 24.68 kg/m      Constitutional:  cooperative, alert and oriented, well developed, well nourished, in no acute distress        Skin:  warm and dry to the touch, no apparent skin lesions or masses noted          Head:  normocephalic, no masses or lesions        Eyes:  pupils equal and round, conjunctivae and lids unremarkable, sclera white, no xanthalasma, EOMS intact, no nystagmus        Lymph:      ENT:  no pallor or cyanosis, dentition good        Neck:  no carotid bruit        Respiratory:  normal breath sounds, clear to auscultation, normal A-P diameter, normal symmetry, normal respiratory excursion, no use of accessory muscles         Cardiac: regular rhythm;no murmurs, gallops or rubs detected                pulses full and equal                                        GI:           Extremities and Muscular Skeletal:  no edema;no spinal abnormalities noted;normal muscle strength and tone              Neurological:  no gross motor deficits        Psych:  affect appropriate, oriented to time, person and place        CC  Jose Fisher MD  [address] [place] [address]

## 2024-09-25 ENCOUNTER — TELEPHONE (OUTPATIENT)
Dept: CARDIOLOGY | Facility: CLINIC | Age: 59
End: 2024-09-25
Payer: COMMERCIAL

## 2024-09-25 NOTE — TELEPHONE ENCOUNTER
----- Message from Carlos Costa sent at 9/24/2024  5:23 PM CDT -----  You can tell patient he does not need to be taking metoprolol succinate half a pill twice a day but can take 25 mg once a day.  It states 2 different things in the chart I am not sure which she is doing.    Called patient, he actually has metoprolol tartrate at home and has been taking 12.5mg BID. Will update Dr Costa.

## 2024-09-26 RX ORDER — METOPROLOL TARTRATE 25 MG/1
12.5 TABLET, FILM COATED ORAL 2 TIMES DAILY
COMMUNITY

## 2024-09-26 NOTE — TELEPHONE ENCOUNTER
Carlos Costa MD  You16 hours ago (4:55 PM)     Patient's choice.  Either will work.  Thanks for the clarification       Spoke to patient, he prefers to continue with the lopressor 12.5mg BID at this time. He does not need any refills. Med list updated.

## 2024-10-25 DIAGNOSIS — E78.01 FAMILIAL HYPERCHOLESTEROLEMIA: Primary | ICD-10-CM

## 2024-10-25 RX ORDER — ATORVASTATIN CALCIUM 40 MG/1
40 TABLET, FILM COATED ORAL DAILY
Qty: 90 TABLET | Refills: 3 | Status: SHIPPED | OUTPATIENT
Start: 2024-10-25

## 2024-11-11 ENCOUNTER — TELEPHONE (OUTPATIENT)
Dept: CARDIOLOGY | Facility: CLINIC | Age: 59
End: 2024-11-11
Payer: COMMERCIAL

## 2024-11-11 DIAGNOSIS — I48.0 PAF (PAROXYSMAL ATRIAL FIBRILLATION) (H): Primary | ICD-10-CM

## 2024-11-11 DIAGNOSIS — I48.0 PAF (PAROXYSMAL ATRIAL FIBRILLATION) (H): ICD-10-CM

## 2024-11-11 RX ORDER — METOPROLOL TARTRATE 25 MG/1
12.5 TABLET, FILM COATED ORAL 2 TIMES DAILY
Qty: 90 TABLET | Refills: 3 | Status: SHIPPED | OUTPATIENT
Start: 2024-11-11

## 2024-11-11 RX ORDER — FLECAINIDE ACETATE 50 MG/1
50 TABLET ORAL 2 TIMES DAILY
Qty: 180 TABLET | Refills: 3 | Status: SHIPPED | OUTPATIENT
Start: 2024-11-11

## 2024-11-11 NOTE — TELEPHONE ENCOUNTER
[facility] Call Center    Phone Message    May a detailed message be left on voicemail: yes     Reason for Call: Medication Refill Request    Has the patient contacted the pharmacy for the refill? Yes   Name of medication being requested: metoprolol tartrate (LOPRESSOR) 25 MG tablet   Provider who prescribed the medication: Not sure who ordered last time  Pharmacy:   [facility] [address] [place] [address] - [address]     Date medication is needed: ASAP, patient has 2 days worth of pills left.       Action Taken: Other: Cardio    Travel Screening: Not Applicable     Date of Service:

## 2024-11-21 ENCOUNTER — TELEPHONE (OUTPATIENT)
Dept: CARDIOLOGY | Facility: CLINIC | Age: 59
End: 2024-11-21
Payer: COMMERCIAL

## 2024-11-21 NOTE — TELEPHONE ENCOUNTER
1st attempt- Left voicemail for the patient to call back and schedule the following:    Appointment type:  Return Cardiology  Provider:  Dr Costa  Return date:  routine/next avail  Additional appointment(s) needed:    Additonal Notes:  Pt needs to cancel and r/s his Milady PHELAN appt- needs to schedule first a CT Calcium, THEN a follow up-    Specialty phone number: [phone]

## 2024-12-05 NOTE — CONFIDENTIAL NOTE
[facility] Refill Guideline reviewed.  Medication meets criteria for refill.    Admit to Labor and Delivery

## 2024-12-30 ENCOUNTER — TELEPHONE (OUTPATIENT)
Dept: FAMILY MEDICINE | Facility: CLINIC | Age: 59
End: 2024-12-30
Payer: COMMERCIAL

## 2024-12-30 DIAGNOSIS — R39.9 LOWER URINARY TRACT SYMPTOMS (LUTS): Primary | ICD-10-CM

## 2024-12-30 NOTE — TELEPHONE ENCOUNTER
"S-(situation):   Patient calling with an update regarding urinary symptoms     B-(background):   Office visit notes with PCP on 8/13/24,  \"1. Benign prostatic hyperplasia with nocturia  Patient tells me he has a history of BPH and was put on Flomax approximately 4 months ago.  He has been having urinary symptoms with nocturia, polyuria, small amounts and low flow when urinating.  I recommended he trial going up to 2 tablets daily instead of 1 and see how this improves symptoms.  If it does not improve them enough I recommend he contact me and would send to urology to consider TURP\"    A-(assessment):   Stream not strong - all the time   Waking up about 6x/night still   Patient did try 2 tabs a day and still not working   No new sx     R-(recommendations):   Routing to PCP to review and advise on next steps.     MARLENE Shelton, RN  [facility]    "

## 2024-12-30 NOTE — TELEPHONE ENCOUNTER
Called and spoke with patient. Relayed provider response and recommendations. Patient endorses understanding and agrees with plan. Provided contact information from urology referral.    Leigha Martinez RN  [facility]

## 2025-01-13 ENCOUNTER — OFFICE VISIT (OUTPATIENT)
Dept: UROLOGY | Facility: CLINIC | Age: 60
End: 2025-01-13
Attending: STUDENT IN AN ORGANIZED HEALTH CARE EDUCATION/TRAINING PROGRAM
Payer: COMMERCIAL

## 2025-01-13 VITALS
HEIGHT: 72 IN | DIASTOLIC BLOOD PRESSURE: 68 MMHG | WEIGHT: 187 LBS | TEMPERATURE: 97.3 F | OXYGEN SATURATION: 98 % | HEART RATE: 61 BPM | SYSTOLIC BLOOD PRESSURE: 114 MMHG | BODY MASS INDEX: 25.33 KG/M2

## 2025-01-13 DIAGNOSIS — R39.9 LOWER URINARY TRACT SYMPTOMS (LUTS): ICD-10-CM

## 2025-01-13 LAB
ALBUMIN UR-MCNC: 20 MG/DL
APPEARANCE UR: CLEAR
BILIRUB UR QL STRIP: NEGATIVE
CAOX CRY #/AREA URNS HPF: ABNORMAL /HPF
COLOR UR AUTO: YELLOW
GLUCOSE UR STRIP-MCNC: NEGATIVE MG/DL
HGB UR QL STRIP: NEGATIVE
KETONES UR STRIP-MCNC: NEGATIVE MG/DL
LEUKOCYTE ESTERASE UR QL STRIP: NEGATIVE
MUCOUS THREADS #/AREA URNS LPF: PRESENT /LPF
NITRATE UR QL: NEGATIVE
PH UR STRIP: 6 [PH] (ref 5–7)
RBC URINE: <1 /HPF
SP GR UR STRIP: 1.03 (ref 1–1.03)
UROBILINOGEN UR STRIP-MCNC: NORMAL MG/DL
WBC URINE: <1 /HPF

## 2025-01-13 NOTE — PROGRESS NOTES
Active order to obtain bladder scan? Yes   Name of ordering provider:  Jocelin amaya  Bladder scan preformed post void Yes:   Bladder scan reveled 20 ML  Provider notified?  Yes    Yennifer Tracy LPN

## 2025-01-13 NOTE — PROGRESS NOTES
Chief Complaint:   LUTS         History of Present Illness:   Lizandro Collado is a 59 year old male with a history of paroxysmal atrial fibrillation who presents for evaluation of LUTS.    The patient reports nocturia x 2-4, weak/slow stream, sensation of incomplete emptying, and urinary hesitancy. He denies urinary incontinence, dysuria, and gross hematuria.     He takes tamsulosin 0.8 mg daily. He does not feel it has been helpful.     He reports regular bowel movements.          Past Medical History:   No past medical history on file.         Past Surgical History:   No past surgical history on file.         Medications     Current Outpatient Medications   Medication Sig Dispense Refill    atorvastatin (LIPITOR) 40 MG tablet Take 1 tablet (40 mg) by mouth daily. 90 tablet 3    flecainide (TAMBOCOR) 50 MG tablet Take 1 tablet (50 mg) by mouth 2 times daily. 180 tablet 3    metoprolol tartrate (LOPRESSOR) 25 MG tablet Take 0.5 tablets (12.5 mg) by mouth 2 times daily. 90 tablet 3    tamsulosin (FLOMAX) 0.4 MG capsule Take 2 capsules (0.8 mg) by mouth daily 180 capsule 3     No current facility-administered medications for this visit.            Allergies:   Patient has no known allergies.         Review of Systems:  From intake questionnaire   Negative 14 system review except as noted on HPI, nurse's note.         Physical Exam:   Patient is a 59 year old  male   Vitals: Blood pressure 114/68, pulse 61, temperature 97.3  F (36.3  C), temperature source Tympanic, height 1.829 m (6'), weight 84.8 kg (187 lb), SpO2 98%.  General Appearance Adult: Alert, no acute distress, oriented.  Lungs: Non-labored breathing.  Heart: No obvious jugular venous distension present.  Neuro: Alert, oriented, speech and mentation normal    PVR: 20 mL      Labs and Pathology:    I personally reviewed all applicable laboratory data and went over findings with patient  Significant for:    CBC RESULTS:  Recent Labs   Lab Test  08/13/24  0802   WBC 5.3   HGB 14.3           BMP RESULTS:  Recent Labs   Lab Test 08/13/24  0802      POTASSIUM 4.4   CHLORIDE 98   CO2 28   ANIONGAP 9   GLC 96   BUN 17.7   CR 0.93   GFRESTIMATED >90   TEREZA 9.6       PSA RESULTS  Prostate Specific Antigen Screen   Date Value Ref Range Status   08/13/2024 1.44 0.00 - 4.50 ng/mL Final          Assessment and Plan:     Assessment: 59 year old male seen in evaluation for nocturia x 2-4, weak stream, urinary hesitancy, and sensation of incomplete bladder emptying. He takes tamsulosin 0.8 mg and reports minimal improvement in his symptoms.     We discussed BPH as the likely cause of his urinary symptoms. We reviewed treatment options including observation, alpha blockers, 5-alpha reductase inhibitors, and bladder outlet surgery. The patient is interested in surgery.     For surgical evaluation, he will need a flow rate, prostate sizing via ultrasound, cystoscopy, and surgical consult. We will coordinate scheduling for this.     AUA ([employer]) SYMPTOM SCORE   1. 4   2. 4   3. 3   4. 1   5. 5   6. 1   7. 4  Total: 18  QoL=6    Plan:  Continue tamsulosin 0.8 mg daily.   Uroflow nurse visit.   Surgical consult with cystoscopy and TRUS for prostate sizing.     ANGEL GABRIEL PA-C  Department of Urology

## 2025-01-21 ENCOUNTER — PATIENT OUTREACH (OUTPATIENT)
Dept: CARE COORDINATION | Facility: CLINIC | Age: 60
End: 2025-01-21
Payer: COMMERCIAL

## 2025-03-11 ENCOUNTER — TELEPHONE (OUTPATIENT)
Dept: CARDIOLOGY | Facility: CLINIC | Age: 60
End: 2025-03-11
Payer: COMMERCIAL

## 2025-03-11 NOTE — TELEPHONE ENCOUNTER
Return call to patent's wife who left voicemail for Dr. Costa's team requesting more information on why a CT Calcium scan was ordered. Writer explained to patient and wife why the test is indicated, how it is performed and what the results are used for. Many questions answered. Patient has already scheduled CT Calcium score for 4/18/25 and has follow up with Dr. Costa scheduled 4/25/25.

## 2025-03-18 ENCOUNTER — TELEPHONE (OUTPATIENT)
Dept: CARDIOLOGY | Facility: CLINIC | Age: 60
End: 2025-03-18
Payer: COMMERCIAL

## 2025-03-18 NOTE — TELEPHONE ENCOUNTER
Received call requesting order for CT Calcium screen be faxed to [facility] at [phone]. Order faxed and patient and spouse updated.

## 2025-03-25 ENCOUNTER — ANCILLARY ORDERS (OUTPATIENT)
Dept: CARDIOLOGY | Facility: CLINIC | Age: 60
End: 2025-03-25
Payer: COMMERCIAL

## 2025-03-25 DIAGNOSIS — E78.01 FAMILIAL HYPERCHOLESTEREMIA: Primary | ICD-10-CM

## 2025-03-31 ENCOUNTER — ANCILLARY PROCEDURE (OUTPATIENT)
Dept: CT IMAGING | Facility: CLINIC | Age: 60
End: 2025-03-31
Attending: INTERNAL MEDICINE
Payer: COMMERCIAL

## 2025-03-31 DIAGNOSIS — E78.01 FAMILIAL HYPERCHOLESTEREMIA: ICD-10-CM

## 2025-03-31 PROCEDURE — 75571 CT HRT W/O DYE W/CA TEST: CPT | Mod: GA | Performed by: INTERNAL MEDICINE

## 2025-04-21 ENCOUNTER — ANCILLARY PROCEDURE (OUTPATIENT)
Dept: ULTRASOUND IMAGING | Facility: CLINIC | Age: 60
End: 2025-04-21
Attending: STUDENT IN AN ORGANIZED HEALTH CARE EDUCATION/TRAINING PROGRAM
Payer: COMMERCIAL

## 2025-04-21 DIAGNOSIS — R16.0 HYPODENSE MASS OF LIVER: ICD-10-CM

## 2025-04-21 PROCEDURE — 76705 ECHO EXAM OF ABDOMEN: CPT | Mod: TC | Performed by: RADIOLOGY

## 2025-04-23 NOTE — RESULT ENCOUNTER NOTE
Lizandro Collado  Your results from your recent clinic visit show:  Your ultrasound of your abdomen showed fatty liver which is common and I recommend working with cardiologist on cholesterol to prevent this from worsening. This is not concerning however. No further imaging is needed    If you have more questions please call the clinic at [phone] or send me a Qapa message    Dr. Jose Levine

## 2025-04-24 ENCOUNTER — OFFICE VISIT (OUTPATIENT)
Dept: URGENT CARE | Facility: URGENT CARE | Age: 60
End: 2025-04-24
Payer: COMMERCIAL

## 2025-04-24 ENCOUNTER — NURSE TRIAGE (OUTPATIENT)
Dept: FAMILY MEDICINE | Facility: CLINIC | Age: 60
End: 2025-04-24

## 2025-04-24 ENCOUNTER — HOSPITAL ENCOUNTER (EMERGENCY)
Facility: CLINIC | Age: 60
Discharge: HOME | End: 2025-04-24
Payer: COMMERCIAL

## 2025-04-24 VITALS
RESPIRATION RATE: 17 BRPM | HEIGHT: 72 IN | BODY MASS INDEX: 25.47 KG/M2 | HEART RATE: 84 BPM | OXYGEN SATURATION: 97 % | DIASTOLIC BLOOD PRESSURE: 61 MMHG | WEIGHT: 188 LBS | TEMPERATURE: 99.3 F | SYSTOLIC BLOOD PRESSURE: 104 MMHG

## 2025-04-24 VITALS
BODY MASS INDEX: 25.6 KG/M2 | DIASTOLIC BLOOD PRESSURE: 57 MMHG | HEIGHT: 72 IN | OXYGEN SATURATION: 98 % | RESPIRATION RATE: 18 BRPM | WEIGHT: 189 LBS | SYSTOLIC BLOOD PRESSURE: 114 MMHG | HEART RATE: 95 BPM | TEMPERATURE: 100.6 F

## 2025-04-24 DIAGNOSIS — R52 BODY ACHES: ICD-10-CM

## 2025-04-24 DIAGNOSIS — R68.89 MULTIPLE COMPLAINTS: Primary | ICD-10-CM

## 2025-04-24 DIAGNOSIS — B34.9 ACUTE VIRAL SYNDROME: ICD-10-CM

## 2025-04-24 DIAGNOSIS — R10.84 ABDOMINAL PAIN, GENERALIZED: ICD-10-CM

## 2025-04-24 DIAGNOSIS — R50.9 FEVER AND CHILLS: ICD-10-CM

## 2025-04-24 LAB
ALBUMIN SERPL BCG-MCNC: 3.6 G/DL (ref 3.5–5.2)
ALBUMIN UR-MCNC: NEGATIVE MG/DL
ALP SERPL-CCNC: 70 U/L (ref 40–150)
ALT SERPL W P-5'-P-CCNC: 20 U/L (ref 0–70)
ANION GAP SERPL CALCULATED.3IONS-SCNC: 7 MMOL/L (ref 7–15)
APPEARANCE UR: CLEAR
AST SERPL W P-5'-P-CCNC: 19 U/L (ref 0–45)
BASOPHILS # BLD AUTO: 0 10E3/UL (ref 0–0.2)
BASOPHILS NFR BLD AUTO: 0 %
BILIRUB SERPL-MCNC: 0.7 MG/DL
BILIRUB UR QL STRIP: NEGATIVE
BUN SERPL-MCNC: 18.7 MG/DL (ref 8–23)
CALCIUM SERPL-MCNC: 8.2 MG/DL (ref 8.8–10.4)
CHLORIDE SERPL-SCNC: 99 MMOL/L (ref 98–107)
COLOR UR AUTO: YELLOW
CREAT SERPL-MCNC: 0.89 MG/DL (ref 0.67–1.17)
EGFRCR SERPLBLD CKD-EPI 2021: >90 ML/MIN/1.73M2
EOSINOPHIL # BLD AUTO: 0 10E3/UL (ref 0–0.7)
EOSINOPHIL NFR BLD AUTO: 0 %
ERYTHROCYTE [DISTWIDTH] IN BLOOD BY AUTOMATED COUNT: 12 % (ref 10–15)
FLUAV RNA SPEC QL NAA+PROBE: NEGATIVE
FLUBV RNA RESP QL NAA+PROBE: NEGATIVE
GLUCOSE SERPL-MCNC: 117 MG/DL (ref 70–99)
GLUCOSE UR STRIP-MCNC: NEGATIVE MG/DL
HCO3 SERPL-SCNC: 24 MMOL/L (ref 22–29)
HCT VFR BLD AUTO: 41.6 % (ref 40–53)
HGB BLD-MCNC: 14 G/DL (ref 13.3–17.7)
HGB UR QL STRIP: NEGATIVE
IMM GRANULOCYTES # BLD: 0 10E3/UL
IMM GRANULOCYTES NFR BLD: 0 %
KETONES UR STRIP-MCNC: NEGATIVE MG/DL
LEUKOCYTE ESTERASE UR QL STRIP: NEGATIVE
LYMPHOCYTES # BLD AUTO: 0.3 10E3/UL (ref 0.8–5.3)
LYMPHOCYTES NFR BLD AUTO: 4 %
MCH RBC QN AUTO: 30.2 PG (ref 26.5–33)
MCHC RBC AUTO-ENTMCNC: 33.7 G/DL (ref 31.5–36.5)
MCV RBC AUTO: 90 FL (ref 78–100)
MONOCYTES # BLD AUTO: 0.3 10E3/UL (ref 0–1.3)
MONOCYTES NFR BLD AUTO: 4 %
NEUTROPHILS # BLD AUTO: 7.1 10E3/UL (ref 1.6–8.3)
NEUTROPHILS NFR BLD AUTO: 92 %
NITRATE UR QL: NEGATIVE
PH UR STRIP: 6 [PH] (ref 5–7)
PLATELET # BLD AUTO: 221 10E3/UL (ref 150–450)
POTASSIUM SERPL-SCNC: 3.9 MMOL/L (ref 3.4–5.3)
PROT SERPL-MCNC: 5.6 G/DL (ref 6.4–8.3)
RBC # BLD AUTO: 4.64 10E6/UL (ref 4.4–5.9)
RSV RNA SPEC NAA+PROBE: NEGATIVE
SARS-COV-2 RNA RESP QL NAA+PROBE: NEGATIVE
SODIUM SERPL-SCNC: 130 MMOL/L (ref 135–145)
SP GR UR STRIP: 1.02 (ref 1–1.03)
UROBILINOGEN UR STRIP-MCNC: NORMAL MG/DL
WBC # BLD AUTO: 7.7 10E3/UL (ref 4–11)

## 2025-04-24 PROCEDURE — 81003 URINALYSIS AUTO W/O SCOPE: CPT

## 2025-04-24 PROCEDURE — 258N000003 HC RX IP 258 OP 636

## 2025-04-24 PROCEDURE — 99284 EMERGENCY DEPT VISIT MOD MDM: CPT | Mod: 25

## 2025-04-24 PROCEDURE — 250N000011 HC RX IP 250 OP 636: Mod: JZ

## 2025-04-24 PROCEDURE — 87637 SARSCOV2&INF A&B&RSV AMP PRB: CPT

## 2025-04-24 PROCEDURE — 99284 EMERGENCY DEPT VISIT MOD MDM: CPT

## 2025-04-24 PROCEDURE — 82310 ASSAY OF CALCIUM: CPT

## 2025-04-24 PROCEDURE — 250N000013 HC RX MED GY IP 250 OP 250 PS 637

## 2025-04-24 PROCEDURE — 96361 HYDRATE IV INFUSION ADD-ON: CPT

## 2025-04-24 PROCEDURE — 36415 COLL VENOUS BLD VENIPUNCTURE: CPT

## 2025-04-24 PROCEDURE — 96374 THER/PROPH/DIAG INJ IV PUSH: CPT

## 2025-04-24 RX ORDER — KETOROLAC TROMETHAMINE 15 MG/ML
15 INJECTION, SOLUTION INTRAMUSCULAR; INTRAVENOUS ONCE
Status: COMPLETED | OUTPATIENT
Start: 2025-04-24 | End: 2025-04-24

## 2025-04-24 RX ORDER — HYOSCYAMINE SULFATE 0.38 MG/1
0.38 TABLET, EXTENDED RELEASE ORAL EVERY 12 HOURS PRN
Qty: 25 TABLET | Refills: 0 | Status: SHIPPED | OUTPATIENT
Start: 2025-04-24

## 2025-04-24 RX ORDER — IBUPROFEN 600 MG/1
600 TABLET, FILM COATED ORAL ONCE
Status: DISCONTINUED | OUTPATIENT
Start: 2025-04-24 | End: 2025-04-24

## 2025-04-24 RX ORDER — ACETAMINOPHEN 325 MG/1
975 TABLET ORAL ONCE
Status: COMPLETED | OUTPATIENT
Start: 2025-04-24 | End: 2025-04-24

## 2025-04-24 RX ADMIN — ACETAMINOPHEN 975 MG: 325 TABLET, FILM COATED ORAL at 18:42

## 2025-04-24 RX ADMIN — KETOROLAC TROMETHAMINE 15 MG: 15 INJECTION, SOLUTION INTRAMUSCULAR; INTRAVENOUS at 18:40

## 2025-04-24 RX ADMIN — SODIUM CHLORIDE, SODIUM LACTATE, POTASSIUM CHLORIDE, AND CALCIUM CHLORIDE 1000 ML: .6; .31; .03; .02 INJECTION, SOLUTION INTRAVENOUS at 18:44

## 2025-04-24 ASSESSMENT — ENCOUNTER SYMPTOMS
PALPITATIONS: 1
HEADACHES: 1
VOMITING: 0
NAUSEA: 1
ABDOMINAL PAIN: 1
WEAKNESS: 1
MYALGIAS: 1
FEVER: 0
SORE THROAT: 1
DIARRHEA: 1
FATIGUE: 1

## 2025-04-24 ASSESSMENT — ACTIVITIES OF DAILY LIVING (ADL)
ADLS_ACUITY_SCORE: 41

## 2025-04-24 ASSESSMENT — COLUMBIA-SUICIDE SEVERITY RATING SCALE - C-SSRS
2. HAVE YOU ACTUALLY HAD ANY THOUGHTS OF KILLING YOURSELF IN THE PAST MONTH?: NO
1. IN THE PAST MONTH, HAVE YOU WISHED YOU WERE DEAD OR WISHED YOU COULD GO TO SLEEP AND NOT WAKE UP?: NO
6. HAVE YOU EVER DONE ANYTHING, STARTED TO DO ANYTHING, OR PREPARED TO DO ANYTHING TO END YOUR LIFE?: NO

## 2025-04-24 NOTE — ED PROVIDER NOTES
"History     Chief Complaint   Patient presents with    Cold Symptoms       Lizandro Collado is a 60 year old male with significant pmhx of paroxysmal afib, familial hypercholesteremia who presents for evaluation of fever, flu-like symptoms.  Patient presents with his wife.  Patient states last night he started to feel unwell and had difficulty sleeping, and when he woke up this morning he was experiencing fever/chills, abdominal discomfort, body aches, sore throat, and fatigue.  He went to work but had to leave early due to his symptoms.  He did go to the urgent care in [place] where he had some blood work drawn but then went home afterward.  He comes into the ER now because after returning home he felt progressively worse, and had a fever of 100.7F.  He is not taking any ibuprofen, Tylenol, or other medications for symptoms.  He has not had much to eat or drink today. He is particularly worried about the body aches in his legs.  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    Allergies:  No Known Allergies    Problem List:    Patient Active Problem List    Diagnosis Date Noted    Familial hypercholesteremia 09/24/2024     Priority: Medium    PAF (paroxysmal atrial fibrillation) (H) 08/24/2015     Priority: Medium     Last Assessment & Plan:    Formatting of this note might be different from the original.   \"[facility]\"    Stop Flecainide   Continue Metoprolol   RTO 2 months will discuss stopping Metoprolol at that time  Last Assessment & Plan:    Formatting of this note might be different from the original.   Pt had been doing well since avoiding EtOH.  However, he recently had an episode of AF requiring ECV.   -  CHADSVASc = 0.  Pt is > 1mo s/p ECV, so AC will be d/c;d.   -  I discussed PVI vs meds with pt.  He prefers to continue with meds at this time.   -  Increase flecainide to 100mg bid.   -  F/u with [facility] in 1yr.          Past Medical History:    No " past medical history on file.    Past Surgical History:    No past surgical history on file.    Family History:    No family history on file.    Social History:  Marital Status:  Single [1]  Social History     Tobacco Use    Smoking status: Never    Smokeless tobacco: Never   Vaping Use    Vaping status: Never Used   Substance Use Topics    Alcohol use: Not Currently    Drug use: Never        Medications:    atorvastatin (LIPITOR) 40 MG tablet  flecainide (TAMBOCOR) 50 MG tablet  hyoscyamine ER (LEVBID) 375 mcg 12 hr tablet  metoprolol tartrate (LOPRESSOR) 25 MG tablet  tamsulosin (FLOMAX) 0.4 MG capsule          Physical Exam   BP: 110/70  Pulse: 95  Temp: 100.6  F (38.1  C)  Resp: 18  Height: 182.9 cm (6')  Weight: 85.7 kg (189 lb)  SpO2: 98 %      Physical Exam  Vitals and nursing note reviewed.   Constitutional:       General: He is not in acute distress.     Appearance: He is not ill-appearing, toxic-appearing or diaphoretic.   HENT:      Head: Normocephalic and atraumatic.      Mouth/Throat:      Mouth: Mucous membranes are moist.      Pharynx: Oropharynx is clear. No oropharyngeal exudate or posterior oropharyngeal erythema.   Eyes:      Extraocular Movements: Extraocular movements intact.      Pupils: Pupils are equal, round, and reactive to light.   Cardiovascular:      Rate and Rhythm: Normal rate and regular rhythm.      Heart sounds: Normal heart sounds.   Pulmonary:      Effort: Pulmonary effort is normal.      Breath sounds: Normal breath sounds. No wheezing, rhonchi or rales.   Abdominal:      Palpations: Abdomen is soft.      Tenderness: There is no abdominal tenderness. There is no guarding or rebound.   Musculoskeletal:         General: Normal range of motion.      Cervical back: Normal range of motion.      Right lower leg: No edema.      Left lower leg: No edema.   Skin:     General: Skin is warm.   Neurological:      General: No focal deficit present.      Mental Status: He is alert and oriented  to person, place, and time.   Psychiatric:         Mood and Affect: Mood normal.         Behavior: Behavior normal.         ED Course        Procedures                    Results for orders placed or performed during the hospital encounter of 04/24/25 (from the past 24 hours)   UA Macroscopic with reflex to Microscopic and Culture    Specimen: Urine, Midstream   Result Value Ref Range    Color Urine Yellow Colorless, Straw, Light Yellow, Yellow    Appearance Urine Clear Clear    Glucose Urine Negative Negative mg/dL    Bilirubin Urine Negative Negative    Ketones Urine Negative Negative mg/dL    Specific Gravity Urine 1.025 1.003 - 1.035    Blood Urine Negative Negative    pH Urine 6.0 5.0 - 7.0    Protein Albumin Urine Negative Negative mg/dL    Urobilinogen Urine Normal Normal mg/dL    Nitrite Urine Negative Negative    Leukocyte Esterase Urine Negative Negative    Narrative    Microscopic not indicated   Influenza A/B, RSV and SARS-CoV2 PCR (COVID-19) Nose    Specimen: Nose; Swab   Result Value Ref Range    Influenza A PCR Negative Negative    Influenza B PCR Negative Negative    RSV PCR Negative Negative    SARS CoV2 PCR Negative Negative    Narrative    Testing was performed using the Xpert Xpress CoV2/Flu/RSV Assay on the Cepheid GeneXpert Instrument. This test should be ordered for the detection of SARS-CoV2, influenza, and RSV viruses in individuals with signs and symptoms of respiratory tract infection. This test is for in vitro diagnostic use under the US FDA for laboratories certified under CLIA to perform high or moderate complexity testing. This test has been US FDA cleared. A negative result does not rule out the presence of PCR inhibitors in the specimen or target RNA in concentration below the limit of detection for the assay. If only one viral target is positive but coinfection with multiple targets is suspected, the sample should be re-tested with another FDA cleared, approved, or authorized test,  if coninfection would change clinical management. This test was validated by the [facility]. These laboratories are certified under the Clinical Laboratory Improvement Amendments of 1988 (CLIA-88) as qualified to perfom high complexity laboratory testing.   Comprehensive metabolic panel   Result Value Ref Range    Sodium 130 (L) 135 - 145 mmol/L    Potassium 3.9 3.4 - 5.3 mmol/L    Carbon Dioxide (CO2) 24 22 - 29 mmol/L    Anion Gap 7 7 - 15 mmol/L    Urea Nitrogen 18.7 8.0 - 23.0 mg/dL    Creatinine 0.89 0.67 - 1.17 mg/dL    GFR Estimate >90 >60 mL/min/1.73m2    Calcium 8.2 (L) 8.8 - 10.4 mg/dL    Chloride 99 98 - 107 mmol/L    Glucose 117 (H) 70 - 99 mg/dL    Alkaline Phosphatase 70 40 - 150 U/L    AST 19 0 - 45 U/L    ALT 20 0 - 70 U/L    Protein Total 5.6 (L) 6.4 - 8.3 g/dL    Albumin 3.6 3.5 - 5.2 g/dL    Bilirubin Total 0.7 <=1.2 mg/dL       Medications   acetaminophen (TYLENOL) tablet 975 mg (975 mg Oral $Given 4/24/25 1842)   ketorolac (TORADOL) injection 15 mg (15 mg Intravenous $Given 4/24/25 1840)   lactated ringers BOLUS 1,000 mL (1,000 mLs Intravenous $New Bag 4/24/25 1844)       Assessments & Plan (with Medical Decision Making)     I have reviewed the nursing notes.    I have reviewed the findings, diagnosis, plan and need for follow up with the patient.    Medical Decision Making  Lizandro Collado is a 60 year old male with significant pmhx of paroxysmal afib, familial hypercholesteremia who presents for evaluation of fever, flu-like symptoms.  Differential diagnoses include COVID, flu, RSV, other viral infection, UTI, pneumonia, other occult infection.  Vital signs with fever of 100.6.  Patient is normotensive, he is not tachycardic, and he is satting 98% on room air with a regular respiratory rate and effort.    Patient was seen at a [place] urgent care in [place] earlier today.  He had multiple complaints including body aches, generalized weakness, headache, fatigue, diarrhea, fever  that onset last night.  He had a CBC, CMP, EKG completed there and was told he would be called with the results.  CMP is still in process.  CBC with a normal white blood cell count, normal hemoglobin.  EKG showed normal sinus rhythm.    On examination patient is awake, alert, oriented, no acute distress.  He is not ill-appearing or toxic-appearing.  He is breathing comfortably on room air and speaking in full sentences without difficulty.  Oropharyngeal exam negative for erythema, swelling, exudate.  Neck with full active range of motion, no pain.  Lungs are clear to auscultation throughout, heart sounds are normal.  Abdomen is soft and nontender.  No swelling or deformity to lower extremities.  Patient is moving all 4 extremities equally.  Low suspicion for acute intra-abdominal pathology warranting advanced imaging as abdomen is nontender on exam without peritoneal findings.  Considered repeating CBC, but he had one drawn earlier today that showed a normal white blood cell count and normal hemoglobin.  No leukocytosis is reassuring against significant infection/inflammation.  He also had an EKG done at that time which showed a normal sinus rhythm.  On heart auscultation here he has a normal rhythm.  Did obtain CMP because the 1 drawn earlier today is still in process.  This showed some hyponatremia at 130, likely due to poor p.o. intake.  No significant electrolyte abnormalities, renal dysfunction, liver dysfunction. Urinalysis is negative for any signs of infection, no red blood cells to suggest urolithiasis.  COVID, influenza, and RSV testing is negative.    Patient received IV fluids, Toradol, Tylenol for his symptoms.  Symptomatic improvement on reevaluation.  He was requesting food on arrival, and was tolerating p.o. food and fluids without difficulty.  Suspect that patient's symptoms are likely due to acute viral infection given constellation of fever, body aches, sore throat, fatigue.  Furthermore he had  not taken any medications to help alleviate his symptoms prior to coming to the ER, as well as fluid intake, and suspect he felt worse after exerting himself at work today.  We discussed symptomatic management with Tylenol and/or ibuprofen as needed, increased fluid intake, supplementation with Gatorade/Pedialyte/Ensure, rest.  He was given strict return precautions should he develop fever lasting greater than 5 days, difficulty breathing, severe abdominal pain, vomiting, or if other concerning symptoms develop.  Patient voiced understanding of the plan and had no further questions.    New Prescriptions    No medications on file       Final diagnoses:   Acute viral syndrome   Fever and chills   Body aches       Mary Costa PA-C  April 24, 2025  [facility] EMERGENCY DEPT     Mary Costa PA-C  04/24/25 2005

## 2025-04-24 NOTE — PROGRESS NOTES
Urgent Care Clinic Visit    Chief Complaint   Patient presents with    Urgent Care    Abdominal Pain     Mid abdominal soreness and slight pain, weakness in the body, HA and fatigue onset last night   Soreness and tingling in bilateral legs   Unable to sleep last night.  One episode of diarrhea this morning - Took Tums and Pepto Bismol this morning   Left early from work due to onset of illness   Denies nausea and vomiting       Patient Request for Note/Letter     For work               4/24/2025    11:52 AM   Additional Questions   Roomed by Ashely   Accompanied by self         4/24/2025   Forms   Any forms needing to be completed Yes

## 2025-04-24 NOTE — ED TRIAGE NOTES
Patient started having chills, body aches, and racing heart rate starting this am. Went to  after work and discharged with pending labs. Pt reports feeling worse after being home.

## 2025-04-24 NOTE — TELEPHONE ENCOUNTER
Nurse Triage SBAR    Is this a 2nd Level Triage? YES, LICENSED PRACTITIONER REVIEW IS REQUIRED    Situation: Patient having body aches, muscle pain, worsening symptoms since being seen in     Background:   Symptoms started last night 4/23/25. He went to  this morning:         Chief Complaint   Patient presents with    Urgent Care    Abdominal Pain       Mid abdominal soreness and slight pain, weakness in the body, HA and fatigue onset last night   Soreness and tingling in bilateral legs   Unable to sleep last night.  One episode of diarrhea this morning - Took Tums and Pepto Bismol this morning   Left early from work due to onset of illness   Denies nausea and vomiting        Patient Request for Note/Letter       For work         He is a new patient of [facility].  Patient presents with one episode of diarrhea this morning, no blood.  Describes as watery with some solid.  Patient states felt weak all day.  Small ST, small HA,  and somewhat SOB.  Did not sleep well last night.  Also has bilateral feet pain, and tingling, fatigue.  Asking for note for work.  He also states he had palpitations this morning.  Hx of afib.     Treatment:  peptobismol and tums  - ?  helped     Review of Systems   Constitutional:  Positive for fatigue. Negative for fever.   HENT:  Positive for sore throat.    Cardiovascular:  Positive for palpitations. Negative for chest pain.   Gastrointestinal:  Positive for abdominal pain, diarrhea and nausea. Negative for vomiting.   Musculoskeletal:  Positive for myalgias.   Skin:  Negative for rash.   Neurological:  Positive for weakness and headaches.   All other systems reviewed and are negative.      Assessment:   Patient noting fever is 100.7, he is having 8/10 generalized pain. He feels weak, and fatigued. Reporting back pain and changes to his breathing.    Protocol Recommended Disposition:   Go To Office Now    Recommendation:   Gave care advice. Offered to check if there are any available  "appointments, patient said he will go to ED instead.    Routing to PCP as FYI    Routed to provider    Does the patient meet one of the following criteria for ADS visit consideration? 16+ years old, with an [facility] PCP     TIP  Providers, please consider if this condition is appropriate for management at one of our Acute and Diagnostic Services sites.     If patient is a good candidate, please use dotphrase <dot>triageresponse and select Refer to ADS to document.  Reason for Disposition   SEVERE pain and taking a statin medicine (a lipid or cholesterol lowering drug)    Additional Information   Negative: Shock suspected (e.g., cold/pale/clammy skin, too weak to stand, low BP, rapid pulse)   Negative: Difficult to awaken or acting confused (e.g., disoriented, slurred speech)   Negative: Sounds like a life-threatening emergency to the triager   Negative: Chest pain   Negative: Arm pains with exertion (e.g., walking)   Negative: Muscle aches from influenza (flu) suspected   Negative: Muscle aches from heat exposure suspected   Negative: Lyme disease suspected (e.g., bull's eye rash or tick bite / exposure in past month)   Negative: Pain only in back   Negative: Pain in one arm OR arm pains caused by recent vigorous activity (e.g., sports, lifting, overuse)   Negative: Pain in one leg OR leg pains caused by recent vigorous activity (e.g., sports, lifting, overuse)   Negative: Rash over large area or most of the body (widespread or generalized)   Negative: Dark (cola or tea-colored) or red-colored urine   Negative: Drinking very little and dehydration suspected (e.g., no urine > 12 hours, very dry mouth, very lightheaded)   Negative: Patient sounds very sick or weak to the triager   Negative: SEVERE pain (e.g., excruciating, unable to do any normal activities) and not improved 2 hours after pain medicine    Answer Assessment - Initial Assessment Questions  1. ONSET: \"When did the muscle aches or body pains start?\"       " "4/23/25    2. LOCATION: \"What part of your body is hurting?\" (e.g., entire body, arms, legs)       Legs, feet, back, arms, shoulders, entire body    3. SEVERITY: \"How bad is the pain?\" (Scale 1-10; or mild, moderate, severe)      8/10 pain    4. CAUSE: \"What do you think is causing the pains?\"      Not sure if he has Covid or anything else    5. FEVER: \"Have you been having fever?\"      99.7 at urgent care, fever 100.7    6. OTHER SYMPTOMS: \"Do you have any other symptoms?\" (e.g., chest pain, weakness, rash, cold or flu symptoms, weight loss)      Fever, head feels hot, has chills, diarrhea, breathing is different, weakness, pain in hips and back, when he is on his feet his feet and legs hurt.    7. PREGNANCY: \"Is there any chance you are pregnant?\" \"When was your last menstrual period?\"      N/A  8. TRAVEL: \"Have you traveled out of the country in the last month?\" (e.g., travel history, exposures)      No    Protocols used: Muscle Aches and Body Pain-A-[place]    "

## 2025-04-24 NOTE — PROGRESS NOTES
SUBJECTIVE:   Lizandro Collado is a 60 year old male presenting with a chief complaint of   Chief Complaint   Patient presents with    Urgent Care    Abdominal Pain     Mid abdominal soreness and slight pain, weakness in the body, HA and fatigue onset last night   Soreness and tingling in bilateral legs   Unable to sleep last night.  One episode of diarrhea this morning - Took Tums and Pepto Bismol this morning   Left early from work due to onset of illness   Denies nausea and vomiting       Patient Request for Note/Letter     For work       He is a new patient of [facility].  Patient presents with one episode of diarrhea this morning, no blood.  Describes as watery with some solid.  Patient states felt weak all day.  Small ST, small HA,  and somewhat SOB.  Did not sleep well last night.  Also has bilateral feet pain, and tingling, fatigue.  Asking for note for work.  He also states he had palpitations this morning.  Hx of afib.    Treatment:  peptobismol and tums  - ?  helped    Review of Systems   Constitutional:  Positive for fatigue. Negative for fever.   HENT:  Positive for sore throat.    Cardiovascular:  Positive for palpitations. Negative for chest pain.   Gastrointestinal:  Positive for abdominal pain, diarrhea and nausea. Negative for vomiting.   Musculoskeletal:  Positive for myalgias.   Skin:  Negative for rash.   Neurological:  Positive for weakness and headaches.   All other systems reviewed and are negative.      No past medical history on file.  No family history on file.  Current Outpatient Medications   Medication Sig Dispense Refill    atorvastatin (LIPITOR) 40 MG tablet Take 1 tablet (40 mg) by mouth daily. 90 tablet 3    flecainide (TAMBOCOR) 50 MG tablet Take 1 tablet (50 mg) by mouth 2 times daily. 180 tablet 3    metoprolol tartrate (LOPRESSOR) 25 MG tablet Take 0.5 tablets (12.5 mg) by mouth 2 times daily. 90 tablet 3    tamsulosin (FLOMAX) 0.4 MG capsule Take 2 capsules (0.8 mg) by mouth daily  180 capsule 3     Social History     Tobacco Use    Smoking status: Never    Smokeless tobacco: Never   Substance Use Topics    Alcohol use: Not Currently       OBJECTIVE  /61   Pulse 84   Temp 99.3  F (37.4  C) (Tympanic)   Resp 17   Ht 1.829 m (6')   Wt 85.3 kg (188 lb)   SpO2 97%   BMI 25.50 kg/m      Physical Exam  Vitals and nursing note reviewed.   Constitutional:       General: He is not in acute distress.     Appearance: Normal appearance. He is normal weight. He is not ill-appearing or toxic-appearing.   Eyes:      Extraocular Movements: Extraocular movements intact.      Conjunctiva/sclera: Conjunctivae normal.   Cardiovascular:      Rate and Rhythm: Normal rate and regular rhythm.      Pulses: Normal pulses.      Heart sounds: Normal heart sounds.   Pulmonary:      Effort: Pulmonary effort is normal.      Breath sounds: Normal breath sounds.   Abdominal:      General: Abdomen is flat. Bowel sounds are normal.      Palpations: Abdomen is soft.      Tenderness: There is no abdominal tenderness.   Skin:     General: Skin is warm and dry.   Neurological:      Mental Status: He is alert.   Psychiatric:         Mood and Affect: Mood normal.         Labs:  Results for orders placed or performed in visit on 04/24/25 (from the past 24 hours)   CBC with platelets and differential    Narrative    The following orders were created for panel order CBC with platelets and differential.  Procedure                               Abnormality         Status                     ---------                               -----------         ------                     CBC with platelets and ...[[id number]]  Abnormal            Final result                 Please view results for these tests on the individual orders.   CBC with platelets and differential   Result Value Ref Range    WBC Count 7.7 4.0 - 11.0 10e3/uL    RBC Count 4.64 4.40 - 5.90 10e6/uL    Hemoglobin 14.0 13.3 - 17.7 g/dL    Hematocrit 41.6 40.0 - 53.0 %     MCV 90 78 - 100 fL    MCH 30.2 26.5 - 33.0 pg    MCHC 33.7 31.5 - 36.5 g/dL    RDW 12.0 10.0 - 15.0 %    Platelet Count 221 150 - 450 10e3/uL    % Neutrophils 92 %    % Lymphocytes 4 %    % Monocytes 4 %    % Eosinophils 0 %    % Basophils 0 %    % Immature Granulocytes 0 %    Absolute Neutrophils 7.1 1.6 - 8.3 10e3/uL    Absolute Lymphocytes 0.3 (L) 0.8 - 5.3 10e3/uL    Absolute Monocytes 0.3 0.0 - 1.3 10e3/uL    Absolute Eosinophils 0.0 0.0 - 0.7 10e3/uL    Absolute Basophils 0.0 0.0 - 0.2 10e3/uL    Absolute Immature Granulocytes 0.0 <=0.4 10e3/uL       EKG:  NSR; HR 74    ASSESSMENT:      ICD-10-CM    1. Multiple complaints  R68.89 EKG 12-lead complete w/read - Clinics     CBC with platelets and differential     Comprehensive metabolic panel (BMP + Alb, Alk Phos, ALT, AST, Total. Bili, TP)     CBC with platelets and differential     Comprehensive metabolic panel (BMP + Alb, Alk Phos, ALT, AST, Total. Bili, TP)      2. Abdominal pain, generalized  R10.84            Medical Decision Making:    Differential Diagnosis:  URI Adult/Peds:  Viral syndrome and arrhythmia    Serious Comorbid Conditions:  Adult:   reviewed    PLAN:    Cmp pending.  Will call with any concerning results.  Rx for levbid prn.  Note for work.  Discussed reasons to seek immediate medical attention.  Additionally if no improvement or worsening in one week, may follow up with PCP and/or UC.        Followup:    If not improving or if condition worsens, follow up with your Primary Care Provider, If not improving or if conditions worsens over the next 12-24 hours, go to the Emergency Department    There are no Patient Instructions on file for this visit.

## 2025-04-24 NOTE — LETTER
2025    Lizandro Collado   1965        To Whom it May Concern;    Please excuse Lizandro Collado from work/school for a healthcare visit on 2025.    Sincerely,        SUJATA Rubio

## 2025-04-24 NOTE — Clinical Note
Lizandro Collado was seen and treated in our emergency department on 4/24/2025.  He may return to work on 04/28/2025.       If you have any questions or concerns, please don't hesitate to call.      Mary Costa PA-C

## 2025-04-25 NOTE — DISCHARGE INSTRUCTIONS
For pain and fever I recommend taking Ibuprofen 400-600mg and/or Tylenol 500-1000mg every 6 hours as needed. You can alternate them so you are taking something every 3 hours (Ibuprofen 9AM, Tylenol 12PM, Ibuprofen 3PM, Tylenol 6PM, etc).     Push fluids to prevent dehydration. You can drink water, tea, liquid IV, gatorade, pedialyte, etc. Avoid caffeine or pop.     Return to the ER if you develop severe headache, vision changes, neck stiffness/pain, uncontrollable vomiting, difficulty breathing, altered mental status/confusion, fever lasting greater than 5 days, or if other concerning symptoms develop.

## 2025-04-28 ENCOUNTER — TELEPHONE (OUTPATIENT)
Dept: CARDIOLOGY | Facility: CLINIC | Age: 60
End: 2025-04-28
Payer: COMMERCIAL

## 2025-04-28 DIAGNOSIS — E78.01 FAMILIAL HYPERCHOLESTEROLEMIA: ICD-10-CM

## 2025-04-28 NOTE — TELEPHONE ENCOUNTER
Called patient to review CT calcium scan and message from Dr Costa, left a message to call back.       ----- Message from Carlos Costa sent at 4/25/2025  4:00 PM CDT -----  Increase atorvastatin 80 mg daily.  LDL of 163 is still too high even for primary prevention.  Please check a FLP and ALT in 4 to 8 weeks.    ----- Message -----  From: Marj Cifuentes RN  Sent: 4/25/2025   3:21 PM CDT  To: Carlos Costa MD    Patient canceled appointment today with Dr Costa, follow up not rescheduled at this time. Test was ordered to guide statin therapy, hx familial hypercholesterolemia. Routed to provider to review.

## 2025-04-29 RX ORDER — ATORVASTATIN CALCIUM 40 MG/1
80 TABLET, FILM COATED ORAL DAILY
COMMUNITY
Start: 2025-04-29

## 2025-04-29 NOTE — TELEPHONE ENCOUNTER
Spoke with patient to review CT calcium score and Dr. Costa's recommendation to increase lipitor to 80mg daily. Patient states he just refilled the 40mg, so will start taking 2 tabs daily.  Patient agreed to an flp/alt in 8 weeks. Order placed.    Patient was not interested in scheduling an [facility] follow up at this time. He missed his 6 month visit with Dr. Costa due to illness.

## 2025-06-18 DIAGNOSIS — E78.01 FAMILIAL HYPERCHOLESTEROLEMIA: ICD-10-CM

## 2025-06-18 RX ORDER — ATORVASTATIN CALCIUM 80 MG/1
80 TABLET, FILM COATED ORAL DAILY
Qty: 90 TABLET | Refills: 1 | Status: SHIPPED | OUTPATIENT
Start: 2025-06-18

## 2025-06-18 NOTE — TELEPHONE ENCOUNTER
Chart reviewed, atorvastatin was updated as historical on 4/29/25 after dose was increased from 40mg daily to 80mg daily. Called pharmacy and let them know that new Rx would be sent. Called patient and let him know this was done. Reminded him to set up labs and [facility] follow up, ok to do in [place] if this is more convenient for him. He expressed understanding and states he will call.              no

## 2025-07-14 ENCOUNTER — PATIENT OUTREACH (OUTPATIENT)
Dept: CARE COORDINATION | Facility: CLINIC | Age: 60
End: 2025-07-14
Payer: COMMERCIAL

## 2025-07-23 ENCOUNTER — OFFICE VISIT (OUTPATIENT)
Dept: UROLOGY | Facility: CLINIC | Age: 60
End: 2025-07-23
Payer: COMMERCIAL

## 2025-07-23 VITALS — SYSTOLIC BLOOD PRESSURE: 108 MMHG | RESPIRATION RATE: 16 BRPM | HEART RATE: 65 BPM | DIASTOLIC BLOOD PRESSURE: 74 MMHG

## 2025-07-23 DIAGNOSIS — N40.1 BENIGN PROSTATIC HYPERPLASIA WITH WEAK URINARY STREAM: Primary | ICD-10-CM

## 2025-07-23 DIAGNOSIS — R39.12 BENIGN PROSTATIC HYPERPLASIA WITH WEAK URINARY STREAM: Primary | ICD-10-CM

## 2025-07-23 NOTE — PROGRESS NOTES
Reason for cystoscopy:    Brief History:  61 yo male past medical history significant for familial hypercholesterolemia and paroxysmal atrial fibrillation thought to be primarily holiday heart.     nocturia x 2-4, weak stream, urinary hesitancy, and sensation of incomplete bladder emptying. He takes tamsulosin 0.8 mg and reports minimal improvement in his symptoms.     PVR 20  PSA 1.44      1/13/2025 saw Jocelin.   [employer] ([employer]) SYMPTOM SCORE   1. 4   2. 4   3. 3   4. 1 (urgency)   5. 5 (weak stream)   6. 1   7. 4  Total: 18  QoL=6    The following distinct labs were reviewed   Most Recent Urinalysis:  Recent Labs   Lab Test 04/24/25  1737 01/13/25  1525   COLOR Yellow Yellow   APPEARANCE Clear Clear   URINEGLC Negative Negative   URINEBILI Negative Negative   URINEKETONE Negative Negative   SG 1.025 1.034   UBLD Negative Negative   URINEPH 6.0 6.0   PROTEIN Negative 20*   NITRITE Negative Negative   LEUKEST Negative Negative   RBCU  --  <1   WBCU  --  <1     CYSTOSCOPY  We discussed the risks and benefits of the procedure which include risk of bleeding and infection.  Informed consent was obtained, the patient was prepped and draped in the standard sterile fashion.  A flexible cystoscope was introduced through a well-lubricated urethra.     Anterior urethra strictures were absent.   The urinary sphincter was intact.  The prostate demonstrated trilobar hypertrophy with prominent median lobe.  Bladder neck was open.   Bladder signififcant for the following:      Diverticuli: No      Cellules: No      Trabeculation: mild       Tumors: No      Stones:No  The ureteral orifices  were identified on each side in orthotopic position  On retroflexion there was the usual bladder neck hyperemia.  There moderate intravesical protrusion of the prostate.      The flexible cystoscope was removed and the findings were described to the patient.                   We also did a transrectal ultrasound to size the  patient's prostate which was about 18.7 grams    EVALUATION AND MANAGEMENT   Upon completion of the cystoscopy the patient was dressed to discuss findings of above testing, review available treatment options and make a plan for further steps in care     SUMMARY:  Benign prostatic hyperplasia with lower urinary tract symptoms  I had a discussion with the patient and his partner today about the findings.  He has a considerable median lobe which I think is the source of his severely obstructive urinary symptoms.  Even though the prostate itself is small I think this anatomy is the reason for his weak stream.  I do not think he would be a good candidate for mist therapies given that median lobe and he is already on full dose of Flomax.  We talked about a resected procedure like transurethral resection of the prostate or moving just the median lobe of the prostate with the laser.  We went over the risks and benefits of each.  We discussed the small risk of incontinence that happens in less than 5% of patients.  We also discussed the risk of retrograde ejaculation injury to the bladder.  After discussing everything the patient elects to proceed with a transurethral resection of the prostate.  Will plan to schedule this sometime in November at his request.        4 -- one or more chronic illness not at goal  4 -- moderate risk surgery

## 2025-07-23 NOTE — NURSING NOTE
Initial /74 (BP Location: Right arm, Patient Position: Chair, Cuff Size: Adult Regular)   Pulse 65   Resp 16  Estimated body mass index is 25.63 kg/m  as calculated from the following:    Height as of 4/24/25: 1.829 m (6').    Weight as of 4/24/25: 85.7 kg (189 lb). .  Patient is here for a cysto.  german gusman LPN

## 2025-07-28 ENCOUNTER — PATIENT OUTREACH (OUTPATIENT)
Dept: CARE COORDINATION | Facility: CLINIC | Age: 60
End: 2025-07-28
Payer: COMMERCIAL

## 2025-08-09 DIAGNOSIS — R35.1 BENIGN PROSTATIC HYPERPLASIA WITH NOCTURIA: ICD-10-CM

## 2025-08-09 DIAGNOSIS — N40.1 BENIGN PROSTATIC HYPERPLASIA WITH NOCTURIA: ICD-10-CM

## 2025-08-11 RX ORDER — TAMSULOSIN HYDROCHLORIDE 0.4 MG/1
0.8 CAPSULE ORAL DAILY
Qty: 180 CAPSULE | Refills: 0 | Status: SHIPPED | OUTPATIENT
Start: 2025-08-11

## 2025-08-12 ENCOUNTER — TELEPHONE (OUTPATIENT)
Dept: UROLOGY | Facility: CLINIC | Age: 60
End: 2025-08-12
Payer: COMMERCIAL